# Patient Record
Sex: FEMALE | Race: WHITE | Employment: FULL TIME | URBAN - METROPOLITAN AREA
[De-identification: names, ages, dates, MRNs, and addresses within clinical notes are randomized per-mention and may not be internally consistent; named-entity substitution may affect disease eponyms.]

---

## 2017-04-26 ENCOUNTER — OFFICE VISIT (OUTPATIENT)
Dept: FAMILY MEDICINE CLINIC | Facility: CLINIC | Age: 37
End: 2017-04-26

## 2017-04-26 VITALS
WEIGHT: 110 LBS | HEART RATE: 85 BPM | HEIGHT: 62 IN | SYSTOLIC BLOOD PRESSURE: 110 MMHG | RESPIRATION RATE: 16 BRPM | DIASTOLIC BLOOD PRESSURE: 70 MMHG | OXYGEN SATURATION: 98 % | BODY MASS INDEX: 20.24 KG/M2 | TEMPERATURE: 98 F

## 2017-04-26 DIAGNOSIS — Z20.818 EXPOSURE TO STREP THROAT: ICD-10-CM

## 2017-04-26 DIAGNOSIS — J02.9 SORE THROAT: Primary | ICD-10-CM

## 2017-04-26 PROCEDURE — 87081 CULTURE SCREEN ONLY: CPT | Performed by: NURSE PRACTITIONER

## 2017-04-26 PROCEDURE — 99213 OFFICE O/P EST LOW 20 MIN: CPT | Performed by: NURSE PRACTITIONER

## 2017-04-26 PROCEDURE — 87880 STREP A ASSAY W/OPTIC: CPT | Performed by: NURSE PRACTITIONER

## 2017-04-26 NOTE — PROGRESS NOTES
CHIEF COMPLAINT:   Patient presents with:  Pharyngitis: son with strep        HPI:   Jose Craig is a 39year old female presents to clinic with complaint of sore throat. Patient has had for 1 days. Symptoms have been consistent since onset.   Patient r LUNGS: clear to auscultation bilaterally, no wheezes or rhonchi. Breathing is non labored.   CARDIO: RRR without murmur  GI: good BS's,no masses, hepatosplenomegaly, or tenderness on direct palpation  EXTREMITIES: no cyanosis, clubbing or edema  LYMPH: + an · Suck on throat lozenges, cough drops, hard candy, ice chips, or frozen fruit-juice bars. Use the sugar-free versions if your diet or medical condition requires them. Gargle to ease irritation  Gargling every hour or 2 can ease irritation.  Try gargling w The patient/parent indicates understanding of these issues and agrees to the plan. The patient is asked to follow up with their PCP as needed.

## 2018-04-09 ENCOUNTER — HOSPITAL ENCOUNTER (OUTPATIENT)
Age: 38
Discharge: HOME | End: 2018-04-09
Attending: PEDIATRICS
Payer: COMMERCIAL

## 2018-04-09 VITALS
OXYGEN SATURATION: 98 % | TEMPERATURE: 98 F | HEART RATE: 91 BPM | RESPIRATION RATE: 17 BRPM | SYSTOLIC BLOOD PRESSURE: 123 MMHG | DIASTOLIC BLOOD PRESSURE: 62 MMHG | HEIGHT: 62 IN | WEIGHT: 112 LBS | BODY MASS INDEX: 20.61 KG/M2

## 2018-04-09 DIAGNOSIS — J01.90 ACUTE SINUSITIS, RECURRENCE NOT SPECIFIED, UNSPECIFIED LOCATION: Primary | ICD-10-CM

## 2018-04-09 DIAGNOSIS — J45.21 MILD INTERMITTENT ASTHMA WITH EXACERBATION: ICD-10-CM

## 2018-04-09 PROCEDURE — 99203 OFFICE O/P NEW LOW 30 MIN: CPT

## 2018-04-09 PROCEDURE — 99204 OFFICE O/P NEW MOD 45 MIN: CPT

## 2018-04-09 RX ORDER — AMOXICILLIN 500 MG/1
CAPSULE ORAL
Refills: 0 | COMMUNITY
Start: 2018-04-04

## 2018-04-09 RX ORDER — BUPROPION HYDROCHLORIDE 150 MG/1
TABLET ORAL
Refills: 0 | COMMUNITY
Start: 2018-03-29

## 2018-04-09 RX ORDER — ALBUTEROL SULFATE 90 UG/1
2 AEROSOL, METERED RESPIRATORY (INHALATION) EVERY 4 HOURS PRN
Qty: 1 INHALER | Refills: 0 | Status: SHIPPED | OUTPATIENT
Start: 2018-04-09 | End: 2018-05-09

## 2018-04-09 RX ORDER — AMOXICILLIN AND CLAVULANATE POTASSIUM 875; 125 MG/1; MG/1
1 TABLET, FILM COATED ORAL 2 TIMES DAILY
Qty: 20 TABLET | Refills: 0 | Status: SHIPPED | OUTPATIENT
Start: 2018-04-09 | End: 2018-04-19

## 2018-04-09 NOTE — ED PROVIDER NOTES
Patient presents with:  Weakness      HPI:     Patricia Machado is a 40year old female who presents for evaluation of a chief complaint of respiratory symptoms for about a week and a half. [facility].   She test encounter:      Orders Placed This Encounter      Albuterol Sulfate  (90 Base) MCG/ACT Inhalation Aero Soln          Sig: Inhale 2 puffs into the lungs every 4 (four) hours as needed.           Dispense:  1 Inhaler          Refill:  0      Spacer/Aer

## 2018-04-09 NOTE — ED INITIAL ASSESSMENT (HPI)
Sick since last Monday night. [facility] with bilateral ear infection, and viral pharyngitis. Patient was given amoxicillin, taken as directed since Wednesday night.  Patient was instructed to come to immediate care if sh

## 2023-07-31 ENCOUNTER — APPOINTMENT (OUTPATIENT)
Dept: CT IMAGING | Age: 43
End: 2023-07-31
Attending: EMERGENCY MEDICINE
Payer: COMMERCIAL

## 2023-07-31 ENCOUNTER — HOSPITAL ENCOUNTER (OUTPATIENT)
Age: 43
Discharge: HOME | End: 2023-07-31
Attending: EMERGENCY MEDICINE
Payer: COMMERCIAL

## 2023-07-31 VITALS
DIASTOLIC BLOOD PRESSURE: 68 MMHG | BODY MASS INDEX: 23 KG/M2 | RESPIRATION RATE: 16 BRPM | WEIGHT: 125 LBS | HEART RATE: 90 BPM | HEIGHT: 62 IN | TEMPERATURE: 98 F | OXYGEN SATURATION: 94 % | SYSTOLIC BLOOD PRESSURE: 115 MMHG

## 2023-07-31 DIAGNOSIS — R10.9 ABDOMINAL PAIN OF UNKNOWN ETIOLOGY: Primary | ICD-10-CM

## 2023-07-31 LAB
#MXD IC: 0.6 X10ˆ3/UL (ref 0.1–1)
B-HCG UR QL: NEGATIVE
BUN BLD-MCNC: 17 MG/DL (ref 7–18)
CHLORIDE BLD-SCNC: 103 MMOL/L (ref 98–112)
CO2 BLD-SCNC: 27 MMOL/L (ref 21–32)
CREAT BLD-MCNC: 0.8 MG/DL
EGFRCR SERPLBLD CKD-EPI 2021: 94 ML/MIN/1.73M2 (ref 60–?)
GLUCOSE BLD-MCNC: 132 MG/DL (ref 70–99)
HCT VFR BLD AUTO: 42.6 %
HCT VFR BLD CALC: 41 %
HGB BLD-MCNC: 13.3 G/DL
ISTAT IONIZED CALCIUM FOR CHEM 8: 0.83 MMOL/L (ref 1.12–1.32)
LYMPHOCYTES # BLD AUTO: 0.5 X10ˆ3/UL (ref 1–4)
LYMPHOCYTES NFR BLD AUTO: 5.9 %
MCH RBC QN AUTO: 27.1 PG (ref 26–34)
MCHC RBC AUTO-ENTMCNC: 31.2 G/DL (ref 31–37)
MCV RBC AUTO: 86.9 FL (ref 80–100)
MIXED CELL %: 7 %
NEUTROPHILS # BLD AUTO: 8.1 X10ˆ3/UL (ref 1.5–7.7)
NEUTROPHILS NFR BLD AUTO: 87.1 %
PLATELET # BLD AUTO: 314 X10ˆ3/UL (ref 150–450)
POCT BILIRUBIN URINE: NEGATIVE
POCT GLUCOSE URINE: NEGATIVE MG/DL
POCT KETONE URINE: NEGATIVE MG/DL
POCT LEUKOCYTE ESTERASE URINE: NEGATIVE
POCT NITRITE URINE: NEGATIVE
POCT PH URINE: 6 (ref 5–8)
POCT PROTEIN URINE: NEGATIVE MG/DL
POCT SPECIFIC GRAVITY URINE: 1.02
POCT URINE CLARITY: CLEAR
POCT URINE COLOR: YELLOW
POCT UROBILINOGEN URINE: 0.2 MG/DL
POTASSIUM BLD-SCNC: 3.8 MMOL/L (ref 3.6–5.1)
RBC # BLD AUTO: 4.9 X10ˆ6/UL
SODIUM BLD-SCNC: 140 MMOL/L (ref 136–145)
WBC # BLD AUTO: 9.2 X10ˆ3/UL (ref 4–11)

## 2023-07-31 PROCEDURE — 80047 BASIC METABLC PNL IONIZED CA: CPT

## 2023-07-31 PROCEDURE — 99205 OFFICE O/P NEW HI 60 MIN: CPT

## 2023-07-31 PROCEDURE — 74177 CT ABD & PELVIS W/CONTRAST: CPT | Performed by: EMERGENCY MEDICINE

## 2023-07-31 PROCEDURE — 81025 URINE PREGNANCY TEST: CPT

## 2023-07-31 PROCEDURE — 96360 HYDRATION IV INFUSION INIT: CPT

## 2023-07-31 PROCEDURE — 99204 OFFICE O/P NEW MOD 45 MIN: CPT

## 2023-07-31 PROCEDURE — 85025 COMPLETE CBC W/AUTO DIFF WBC: CPT | Performed by: EMERGENCY MEDICINE

## 2023-07-31 PROCEDURE — 81002 URINALYSIS NONAUTO W/O SCOPE: CPT | Performed by: EMERGENCY MEDICINE

## 2023-07-31 RX ORDER — SODIUM CHLORIDE 9 MG/ML
1000 INJECTION, SOLUTION INTRAVENOUS ONCE
Status: COMPLETED | OUTPATIENT
Start: 2023-07-31 | End: 2023-07-31

## 2023-07-31 RX ORDER — LEFLUNOMIDE 20 MG/1
20 TABLET ORAL DAILY
COMMUNITY

## 2023-07-31 RX ORDER — PREDNISONE 10 MG/1
10 TABLET ORAL DAILY
COMMUNITY

## 2023-07-31 NOTE — ED INITIAL ASSESSMENT (HPI)
Pt c/o lower abdominal cramping wrapping around lower back and into bilat legs starting Thursday, diarrhea on Friday and nausea Saturday, denies vomiting or urinary s/s

## 2024-02-21 ENCOUNTER — LAB ENCOUNTER (OUTPATIENT)
Dept: LAB | Age: 44
End: 2024-02-21
Attending: STUDENT IN AN ORGANIZED HEALTH CARE EDUCATION/TRAINING PROGRAM
Payer: COMMERCIAL

## 2024-02-21 ENCOUNTER — TELEPHONE (OUTPATIENT)
Dept: NEUROLOGY | Facility: CLINIC | Age: 44
End: 2024-02-21

## 2024-02-21 ENCOUNTER — OFFICE VISIT (OUTPATIENT)
Dept: NEUROLOGY | Facility: CLINIC | Age: 44
End: 2024-02-21
Payer: COMMERCIAL

## 2024-02-21 VITALS
OXYGEN SATURATION: 94 % | HEART RATE: 100 BPM | BODY MASS INDEX: 25 KG/M2 | DIASTOLIC BLOOD PRESSURE: 74 MMHG | RESPIRATION RATE: 16 BRPM | WEIGHT: 135.19 LBS | SYSTOLIC BLOOD PRESSURE: 124 MMHG

## 2024-02-21 DIAGNOSIS — R29.898 RIGHT ARM WEAKNESS: ICD-10-CM

## 2024-02-21 DIAGNOSIS — R20.2 PARESTHESIAS: ICD-10-CM

## 2024-02-21 DIAGNOSIS — G62.9 SMALL FIBER NEUROPATHY: ICD-10-CM

## 2024-02-21 DIAGNOSIS — G50.0 RIGHT TRIGEMINAL NEURALGIA: ICD-10-CM

## 2024-02-21 DIAGNOSIS — R29.2 HYPERREFLEXIA: ICD-10-CM

## 2024-02-21 DIAGNOSIS — R29.898 RIGHT ARM WEAKNESS: Primary | ICD-10-CM

## 2024-02-21 LAB
ANION GAP SERPL CALC-SCNC: 6 MMOL/L (ref 0–18)
BUN BLD-MCNC: 13 MG/DL (ref 9–23)
CALCIUM BLD-MCNC: 10.2 MG/DL (ref 8.5–10.1)
CHLORIDE SERPL-SCNC: 103 MMOL/L (ref 98–112)
CO2 SERPL-SCNC: 30 MMOL/L (ref 21–32)
CREAT BLD-MCNC: 0.83 MG/DL
EGFRCR SERPLBLD CKD-EPI 2021: 90 ML/MIN/1.73M2 (ref 60–?)
FASTING STATUS PATIENT QL REPORTED: NO
GLUCOSE BLD-MCNC: 101 MG/DL (ref 70–99)
HCG UR QL: NEGATIVE
OSMOLALITY SERPL CALC.SUM OF ELEC: 288 MOSM/KG (ref 275–295)
POTASSIUM SERPL-SCNC: 4.2 MMOL/L (ref 3.5–5.1)
SODIUM SERPL-SCNC: 139 MMOL/L (ref 136–145)

## 2024-02-21 PROCEDURE — 99205 OFFICE O/P NEW HI 60 MIN: CPT | Performed by: STUDENT IN AN ORGANIZED HEALTH CARE EDUCATION/TRAINING PROGRAM

## 2024-02-21 PROCEDURE — 81025 URINE PREGNANCY TEST: CPT

## 2024-02-21 PROCEDURE — 3078F DIAST BP <80 MM HG: CPT | Performed by: STUDENT IN AN ORGANIZED HEALTH CARE EDUCATION/TRAINING PROGRAM

## 2024-02-21 PROCEDURE — 80048 BASIC METABOLIC PNL TOTAL CA: CPT

## 2024-02-21 PROCEDURE — 3074F SYST BP LT 130 MM HG: CPT | Performed by: STUDENT IN AN ORGANIZED HEALTH CARE EDUCATION/TRAINING PROGRAM

## 2024-02-21 PROCEDURE — 36415 COLL VENOUS BLD VENIPUNCTURE: CPT

## 2024-02-21 RX ORDER — PREDNISONE 5 MG/1
5 TABLET ORAL DAILY
COMMUNITY
Start: 2023-12-28

## 2024-02-21 RX ORDER — IBUPROFEN 200 MG
800 TABLET ORAL AS NEEDED
COMMUNITY

## 2024-02-21 RX ORDER — PREDNISONE 1 MG/1
4 TABLET ORAL DAILY
COMMUNITY
Start: 2024-02-03

## 2024-02-21 NOTE — TELEPHONE ENCOUNTER
Dr Bruno saw patient today and referred patient to [facility], Neurology for evaluation/ autonomic testing for possible small fiber neuropathy. Advised patient to call her insurance to  if covered.Referral faxed with fax confirmation received.

## 2024-02-21 NOTE — H&P
Neurology  History & Physical      HISTORY OF PRESENT ILLNESS:  Neurology new office visit    Interval history February 21, 2024:   -Sweat test 7/5/2023 suggestive of small fiber neuropathy affecting sudomotor fibers of right upper extremity and right lower extremity   =electromyelogram and nerve conduction study (EMG/NCS) 7/5/2023 right arm and leg unrevealing  -Skin biopsy, do not see report from this.   -Saw Dr. Romano 8/2023 \"Morelia Camarillo is a 43Yrs old female patient who presents with right sided numbness, paresthesia, weakness for 2 years now, associated with joints pain. she had rheumatologic evaluation and workup that have been negative so far. She was started on prednisone and leflunomide that she thinks might somewhat slow progression of her symptoms. She had recent EMG that is reported normal, skin biopsy reported as normal. A sweat test showed decreased sweat on the right side. Per patient, the left side was not tested. She sometimes had paresthesia on both hands and feet.She is pending lip biopsy to rule out sjogren's . And she plans on seeing an autonomic specialist. We will wait to see the autonomic workup before deciding on future direction, workup and treatment. Her symptoms and sweat test suggest some dysautonomia. We cannot yet answer what seems to be the cause. We agrees with Sjogren's workup as well as seeing the autonomic neurologist for evaluation and workup.\"    Patient presents to establish care. I already know her from my time in fellowship at the [facility] with Dr. Romano. She reports seeing Dr. Romano in August, he said at early stage of something. At the time didn't see enough clinical change to order MRIs again. Advised following up in a year or sooner if needed. He is on board. She reports symptoms continue to progress. Leflunomide has helped with working around and functioning. Pain and neuropathy on right side very difficult. Two weeks ago and two days ago mobility  impairment in right arm. Normally mobility impairment in legs, sits down and works. Couldn't work at computer. Also having floaters in eye sight. Has had some acid reflux that was not happening and is popping back up. Word retrieval and memory becoming a bigger issue. Worsened over the past few months insidiously, has noticed more difference with right arm in past few weeks (seems triggered by activity). Dr. Gipson- sheldon arvizu, going to [facility], last visit 12/2023. Did not have good experience with ware    Neur review of systems:  -Cognition: more word finding troubles over months, has had a couple instances where could not bring words (could not find words, words on tip of tongue), has had a couple times where really got stuck lasting a couple minutes, no other associated symptoms that were more pronounced  -Fatigue: yes  -Pain: sharp pains right side of face, thought it was teeth (dental workup unrevealing), pain consistent with some zapping, could trigger with palpation to certain area, lasted for a couple weeks 11/2023, self resolved  -Depression/anxiety: no big changes, some anxiety in setting of unknown  -Vision: she reports floaters periodically when in shower confident in both eyes, getting out of shower. Floaters improved with getting out of shower and cooling off (some associated lightheadedness). Intermittently will see something in peripheral vision not moving (no confusion, bowel/bladder loss of control). Usually happens when standing. Eye doctor doesn't see any proteins floating around per patient.   -Dysphagia/dysarthria: intermittent trouble with solids/liquids similar (better on lefluonomide 20 mg, no major aspiration issues since 1/2023), couple episodes of reflux in last month  -GI: early satiety, acid reflux without clear provocation  -Motor: right arm/face/leg weakness, feels getting steadily worse, mostly able to maintain functioning. Was better on leflunomide. When she has bad days  it lasts for day, usually some improvement when wakes in AM and has rested, sometimes goes on for a few days before resolves. Right arm got really weak and very painful, more she tried to use it the weaker it was getting, rested it, lasted for better part of day, had to stop due to arm not working.   -Sensory: right arm/face/leg numbness and tingling getting worse overall, rare tingling on left side of foot but not as pronounced. Right arm tremor improved on leflunomide but still slightly evident with postural hold   -Ambulation: sometimes feels like needs a cane, some close calls with walking. Last January 2023 fell in January- legs gave out (getting out of car, no premonitory signs, no passing out, gets right back up and back to normal immediately, two similar episodes in the fall), when feeling weak feels a little wobbly   -Bowel: no bleeding in awhile, feels body has adjusted to leflunomide (previously some loose stools in AM)  -Bladder: thinks she feels fully empties bladder, no recent infections, no urinary frequency  -Lhermittes: some localized shoulder pito  -Tobacco: no  -Exercise: stretches almost daily, hasn't done a whole lot more than that as seems to aggravate symptoms    Leflunomide last incresaed 1/2023. Started 10/2022 didn't work iniitally.     Interval history 05/16/23:   -Per jerald 5/2023 \"Helpful for you to know: started Leflunomide in October and while it hasn't helped the rheum symptoms too much it has helped the neuro symptoms significantly. It isn't perfect but minus the fall in January I haven't fallen or aspirated since being on it. However, began new job in March which is a really positive thing. I am more physically active and that is escalating symptoms again. Headaches, pain in face, choking on saliva/water, nerve pain in extremities, weakness on the right side, and fatigue have all increased again. I have also had an increase in GI symptoms which may be medication related. I have an  upcoming GI consult this Friday and am looking into a separate rheum consult. I see my current rheum for follow up on Thursday.\"  -Saw rheum 2/2023 at [facility] for joint pain \"Joint pains Life-long history of diffuse body pain, which has been attributed to fibromyalgia, and most recent development of diffuse joint pains and stiffness since October 2022, which may have been mildly responsive corticosteroids; unresponsive (maybe a little worse with leflunomide). There are no features of inflammatory arthritis on physical examination. Immunoserologic evaluation for systemic rheumatic diseases associated with inflammatory arthritis was unrevealing. Otherwise, patient has experienced paresthesias and mild weakness affecting the entire right-side of her body, with negative CNS imaging. At the moment, I question her presentation is consistent with inflamamtory arthritis and recommend additional investigation. Normal hand MRI was supportive of our suspicion that her pains are not inflammatory in nature and I have not been able to identify any objective evidence of an underlying systemic autoimmune disease.\" Advised discontinuing leflunomide.   -Seen in hospital 12/2022 by neurology consult team \"41 yo F with PMHx MCTD, reported vasculitis, fibromyalgia, IBS who follows with Dr. Romano for multiple neurologic symptoms (right hemibody loss of sensation, increased LE DTR). Today examination is significant for right hemibody subjective decreased sensation, increase LE DTRs and +BRIAN maneuver suggesting hip pathology rather than lower back pathology. No need for further neurological imaging; recommend rheumatology f/u and OP f/u with Dr. Romano.  -Per rheum 1/2023 [facility] \"this is a 42 y.o. female with possible UCTD based on symptoms (though negative blood work). She has had some response to leflunomide 10 mg, and since this medication can work for neurologic indications, and increasing its dose to 20 mg daily. She will be seeing  rheumatology [facility] next month for another opinion. Continue with prednisone 10 mg daily for now. Bactrim 3 times weekly.\"    Per discussion with patient, leflunomide seems to be tempering neurologic symptoms. Has noticed since going back to work and being more active, some symptoms a bit more exacerbated (headaches, choking, weakness), but overall improved. Started leflunomide 10/2022, then increased dose 2023 which seemed to help. Reports one new symptoms with rectal bleeding (arranging appointment with GI)- has regular loose stools with leflunomide, every time she used bathroom, quite a bit of blood like menstrual cycle (blood separate from bowel movement)- bright red. Stopped spontaneously after about 24 hours. Hasn't had swallow evaluation yet, waiting to see GI. Hasn't seen GI for years.     PAST MEDICAL HISTORY:  Past Medical History:   Diagnosis Date    Anxiety     Connective tissue disease (HCC)     Fibromyalgia     IBS (irritable bowel syndrome)        PAST SURGICAL HISTORY:  Past Surgical History:   Procedure Laterality Date             FAMILY HISTORY:  family history includes Diabetes in her paternal grandmother; Heart Disorder in her maternal grandfather.  FMHx: Paternal first cousin struggling for 15 years with similar course, diagnosed with psoriatic arthritis, also with paternal uncle with some sort of autoimmune dysfunction. Paternal uncle had stroke in 50s in setting of psoriatic arthritis. Another paternal cousin with psoriasis    SOCIAL HISTORY:   reports that she has never smoked. She has never used smokeless tobacco. She reports that she does not currently use alcohol. She reports that she does not use drugs. Works in social work.     ALLERGIES:  No Known Allergies    MEDICATIONS:  Prior to Admission Medications   Medication Sig    predniSONE 5 MG Oral Tab Take 1 tablet (5 mg total) by mouth daily.    predniSONE 1 MG Oral Tab Take 4 tablets (4 mg total) by mouth daily.     ibuprofen (ADVIL) 200 MG Oral Tab Take 4 tablets (800 mg total) by mouth as needed for Pain.    leflunomide 20 MG Oral Tab Take 1 tablet (20 mg total) by mouth daily.    BuPROPion HCl ER, XL, 150 MG Oral Tablet 24 Hr 1 tablet (150 mg total)  in the morning and 1 tablet (150 mg total) before bedtime.    alprazolam 0.25 MG Oral Tab Take 1 tablet (0.25 mg total) by mouth nightly as needed for Sleep.     No current facility-administered medications for this visit.       REVIEW OF SYSTEMS:  A 10-point system was reviewed.  Pertinent positives and negatives are noted in HPI.      PHYSICAL EXAMINATION:  VITAL SIGNS: /74   Pulse 100   Resp 16   Wt 135 lb 3.2 oz (61.3 kg)   LMP 07/06/2023   SpO2 94%   BMI 24.73 kg/m²   General: She is not in acute distress.  Appearance: She is not ill-appearing.   HENT:   Head: Normocephic  Effort: Pulmonary effort is normal.   Musculoskeletal:   General: No swelling. Normal range of motion.   Cervical back: Normal range of motion and neck supple.   Skin:  General: Skin is warm and dry.     Neurologic Exam   Mental status & higher functions: Awake & alert, oriented x3, normal attention, language and speech. Judgement appropriate    Cranial nerves: II: Preserved visual fields. Optic disc margins sharp  III-IV-VI: 4 mm Pupils equal round and reactive to light. Gaze conjugate, vertical and horizontal eye movements intact. No nystagmus.   V1-2-3: Facial sensation intact but more sensitive on right. VII: symmetric, intact strength VIII: Hearing preserved.   IX-X: symmetric palate elevation. XII: tongue protrudes midline w/o fasciculations.   XI: turns head bilaterally, shrugs shoulders symmetrically.  MOTOR   Bulk: Preserved throughout   Tone: Normal in BUE and BLE   Pronator drift: Present on right (trace)  Trace tremor in right hand postural (appears physiologic)   Effort: Adequate   STRENGTH Left Right root nerve   Deltoid 5 5 C5, C6 axillary   Biceps 5 5 C5, C6 musculocutan    Triceps 5 5 C7 radial   Wrist extension 5 5 C6, C7 radial   Wrist flexion 5 5 C6, C7, C8, T1 median-ulnar   Finger flex (DIP) 5 5 C7, C8, T1 median-ulnar   Finger extension 5 5 C7, C8 post. interosseus   Finger abduction 5 5 C8, T1 ulnar   Thumb abd (ABP) 5 5 C8, T1 median   Hand grasp 5 5 multi multi         Hip flexion 5 4.8, some giveway /L2, L3, L4 femoral   Knee extension 5 5 L2, L3, L4 femoral   Knee flexion 5 5 L5, S1,S2 sciatic   Ankle Dorsiflexion 5 5 L4, L5 deep peroneal   Ankle Plantarflexion 5 5 S1, S2 tibial       No negative asterixis with hands outstretched.     REFLEXES Left Right root   BR 2+ 2+ C5-6   Biceps 2+ 2+ C5-6   Triceps 2+ 2+ C7   Hanson’s neg neg   Patella 2 2 L3-L4   Hamstring neg neg L5   Achilles 2 2 S1   Plantar clonus neg neg   Toes/Babinski down down   Sensory: pin prick decreased right leg/face/arm, vib decresed right great toe 12 s  Coordination: FTN, HTS reveal no ataxia or dysmetria. No dysdiadochokinesia.  Station & gait: Romberg negative, normal posture, stability/balance, stride and arm swing. Able to tandem.      DIAGNOSTIC DATA:      IMAGING:  MRI brain wo, MRI lumbar wo (3/29/2022):  Impression: Normal MRI of brain with contrast.     Impression:     1.  Mild disc desiccation without disc herniation, central stenosis, or foraminal narrowing throughout lumbar spine.  See above discussion.     2.  Normal conus medullaris.  No focal collections are noted in lumbar spinal canal.     3.  Normal alignment.  Vertebral body height and marrow signal appear normal.     CTA head/neck 7/3/22  Impression:   1. Patency of the intracranial arterial circulation   2. Patency of the arterial vasculature of the neck.   3. No acute intracranial abnormality.   See above details/description of other findings. Please clinically correlate.     MR brain 2/2022:   FINDINGS:   No restricted diffusion to suggest acute intracranial process.     No abnormal parenchymal or leptomeningeal enhancement.      There is no acute intracranial hemorrhage, extracerebral fluid collection, or significant midline shift.     Ventricles and sulci are normal in size and configuration for the patient's age. No focal white matter lesion     The visualized paranasal sinuses and mastoid air cells are clear.     Partially visualized cervical spondylosis in the mid cervical spine with associated mild spinal canal stenosis     IMPRESSION:     1.  No acute intracranial abnormality.     2.  No white matter lesion or abnormal enhancement     - OSH (1/25/2022): neg (JUNE, RF, CCP), normal CRP and ESR  MRI wwo R hand (2/22/23): wnl, no signs of synovitis    Per 2022 note \" Neurology [facility] who ordered MRI cervical which per patient was normal maybe slight difference is disc. 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 She reports symptoms continue to progress insidiously with right face/arm/leg paresthesias, with waxing and waning right arm/face/leg mobility impairment. Reports intermittent \"floaters\" in eyes with showering, and some word retrieval troubles, early satiety. Symptoms worse with activity. She recently realized there is a family history with similar constellation of symptoms. Thinks leflunomide may have slowed sx progression. Exam with reduced pinprick right face/arm/leg, trace right hip flexion weakness, normal reflexes. Recent sweat test with reduced sweat on right side suggestive of small fiber neuropathy affecting sudomotor fibers. Recent skin biopsy reportedly normal (per Dr. Romano note). Lip biopsy result unclear. Clinical picture  concerning for small fiber neuropathy of unclear etiology. Given reports of intermittent motor symptoms and normal reflexes, will pursue neuro-axis imaging again. Also will order some blood work; she follows closely with rheumatology, but I do not currently have access to those records, ask that she provide them to us when able. Her rheumatologist advise she seek another opinion at [facility], which we support. Discussed a gastric emptying study and swallow eval; she will hold off for now, as may get done at [facility]. She is to let us know if she wants to pursue. Will also refer to my colleagues at the [facility] to look into possible autonomic testing and help in quest for etiology (Dr. Patterson or Cade). Pending blood-work, may consider nerve biopsy or further vascular imaging. She is to let us know if symptoms evolve; ED for new neuro symptoms/sudden onset.       Plan:  -MRI brain/cervical spine/thoracic spine with/without contrast   -Go to lab prior for pregnancy and kidney function test  -Support seeing [facility] for more in depth rheumatologic work-up   -May consider gastric emptying study, autonomic testing  -Referral to the [facility] autonomics specialists, has appt with DR Justin next year  -Go to the lab when able    Total time 64 minutes including chart review, eliciting history, physical exam, and counseling.    Jeannette Bruno, DO

## 2024-02-21 NOTE — PROGRESS NOTES
Patient states right sided weakness, which started about 2 years ago. Decrease in gait, denies recent falls. Patient states numbness, tinging and pain on the right side.

## 2024-02-21 NOTE — TELEPHONE ENCOUNTER
Jeannette Bruno DO [facility] [place] [facility] Hello,   Can someone please fax the MRI referrals (brain/cervical/thoracic) to [facility]?  And please send patient update when faxed.    Faxed to [facility] with confirmation received.     Patient advised.

## 2024-02-21 NOTE — PATIENT INSTRUCTIONS
Plan:  -MRI Brain/cervical/thoracic spine   -Bring CD with all images  -Referral for autonomics specialist  -Please ask rheumatologist to fax the notes  Refill policies:    Allow 2-3 business days for refills; controlled substances may take longer.  Contact your pharmacy at least 5 days prior to running out of medication and have them send an electronic request or submit request through the “request refill” option in your [employer] account.  Refills are not addressed on weekends; covering physicians do not authorize routine medications on weekends.  No narcotics or controlled substances are refilled after noon on Fridays or by on call physicians.  By law, narcotics must be electronically prescribed.  A 30 day supply with no refills is the maximum allowed.  If your prescription is due for a refill, you may be due for a follow up appointment.  To best provide you care, patients receiving routine medications need to be seen at least once a year.  Patients receiving narcotic/controlled substance medications need to be seen at least once every 3 months.  In the event that your preferred pharmacy does not have the requested medication in stock (e.g. Backordered), it is your responsibility to find another pharmacy that has the requested medication available.  We will gladly send a new prescription to that pharmacy at your request.    Scheduling Tests:    If your physician has ordered radiology tests such as MRI or CT scans, please contact Central Scheduling at [phone] right away to schedule the test.  Once scheduled, the [facility] Centralized Referral Team will work with your insurance carrier to obtain pre-certification or prior authorization.  Depending on your insurance carrier, approval may take 3-10 days.  It is highly recommended patients assure they have received an authorization before having a test performed.  If test is done without insurance authorization, patient may be responsible for the entire amount billed.       Precertification and Prior Authorizations:  If your physician has recommended that you have a procedure or additional testing performed the [facility] Centralized Referral Team will contact your insurance carrier to obtain pre-certification or prior authorization.    You are strongly encouraged to contact your insurance carrier to verify that your procedure/test has been approved and is a COVERED benefit.  Although the [facility] Centralized Referral Team does its due diligence, the insurance carrier gives the disclaimer that \"Although the procedure is authorized, this does not guarantee payment.\"    Ultimately the patient is responsible for payment.   Thank you for your understanding in this matter.  Paperwork Completion:  If you require FMLA or disability paperwork for your recovery, please make sure to either drop it off or have it faxed to our office at [phone]. Be sure the form has your name and date of birth on it.  The form will be faxed to our Forms Department and they will complete it for you.  There is a 25$ fee for all forms that need to be filled out.  Please be aware there is a 10-14 day turnaround time.  You will need to sign a release of information (GOMEZ) form if your paperwork does not come with one.  You may call the Forms Department with any questions at [phone].  Their fax number is [phone].

## 2024-02-26 ENCOUNTER — APPOINTMENT (OUTPATIENT)
Dept: CT IMAGING | Facility: HOSPITAL | Age: 44
End: 2024-02-26
Attending: EMERGENCY MEDICINE
Payer: COMMERCIAL

## 2024-02-26 ENCOUNTER — TELEPHONE (OUTPATIENT)
Dept: NEUROLOGY | Facility: CLINIC | Age: 44
End: 2024-02-26

## 2024-02-26 ENCOUNTER — HOSPITAL ENCOUNTER (EMERGENCY)
Facility: HOSPITAL | Age: 44
Discharge: HOME | End: 2024-02-26
Attending: EMERGENCY MEDICINE
Payer: COMMERCIAL

## 2024-02-26 VITALS
HEART RATE: 84 BPM | TEMPERATURE: 97 F | RESPIRATION RATE: 19 BRPM | WEIGHT: 125 LBS | OXYGEN SATURATION: 97 % | BODY MASS INDEX: 23 KG/M2 | SYSTOLIC BLOOD PRESSURE: 120 MMHG | DIASTOLIC BLOOD PRESSURE: 79 MMHG

## 2024-02-26 DIAGNOSIS — R53.1 RIGHT SIDED WEAKNESS: Primary | ICD-10-CM

## 2024-02-26 PROBLEM — G62.9 SMALL FIBER NEUROPATHY: Status: ACTIVE | Noted: 2024-02-26

## 2024-02-26 LAB
ALBUMIN SERPL-MCNC: 4.1 G/DL (ref 3.4–5)
ALBUMIN/GLOB SERPL: 1.2 {RATIO} (ref 1–2)
ALP LIVER SERPL-CCNC: 71 U/L
ALT SERPL-CCNC: 13 U/L
ANION GAP SERPL CALC-SCNC: 7 MMOL/L (ref 0–18)
AST SERPL-CCNC: 14 U/L (ref 15–37)
ATRIAL RATE: 87 BPM
BASOPHILS # BLD AUTO: 0.05 X10(3) UL (ref 0–0.2)
BASOPHILS NFR BLD AUTO: 0.6 %
BILIRUB SERPL-MCNC: 0.4 MG/DL (ref 0.1–2)
BUN BLD-MCNC: 12 MG/DL (ref 9–23)
CALCIUM BLD-MCNC: 9.7 MG/DL (ref 8.5–10.1)
CHLORIDE SERPL-SCNC: 107 MMOL/L (ref 98–112)
CO2 SERPL-SCNC: 26 MMOL/L (ref 21–32)
CREAT BLD-MCNC: 0.73 MG/DL
EGFRCR SERPLBLD CKD-EPI 2021: 105 ML/MIN/1.73M2 (ref 60–?)
EOSINOPHIL # BLD AUTO: 0.01 X10(3) UL (ref 0–0.7)
EOSINOPHIL NFR BLD AUTO: 0.1 %
ERYTHROCYTE [DISTWIDTH] IN BLOOD BY AUTOMATED COUNT: 13.1 %
GLOBULIN PLAS-MCNC: 3.5 G/DL (ref 2.8–4.4)
GLUCOSE BLD-MCNC: 94 MG/DL (ref 70–99)
HCG SERPL QL: NEGATIVE
HCT VFR BLD AUTO: 40.6 %
HGB BLD-MCNC: 13.3 G/DL
IMM GRANULOCYTES # BLD AUTO: 0.04 X10(3) UL (ref 0–1)
IMM GRANULOCYTES NFR BLD: 0.5 %
LYMPHOCYTES # BLD AUTO: 0.58 X10(3) UL (ref 1–4)
LYMPHOCYTES NFR BLD AUTO: 7.3 %
MCH RBC QN AUTO: 28.2 PG (ref 26–34)
MCHC RBC AUTO-ENTMCNC: 32.8 G/DL (ref 31–37)
MCV RBC AUTO: 86 FL
MONOCYTES # BLD AUTO: 0.45 X10(3) UL (ref 0.1–1)
MONOCYTES NFR BLD AUTO: 5.7 %
NEUTROPHILS # BLD AUTO: 6.77 X10 (3) UL (ref 1.5–7.7)
NEUTROPHILS # BLD AUTO: 6.77 X10(3) UL (ref 1.5–7.7)
NEUTROPHILS NFR BLD AUTO: 85.8 %
OSMOLALITY SERPL CALC.SUM OF ELEC: 290 MOSM/KG (ref 275–295)
P AXIS: 73 DEGREES
P-R INTERVAL: 136 MS
PLATELET # BLD AUTO: 311 10(3)UL (ref 150–450)
POTASSIUM SERPL-SCNC: 4.2 MMOL/L (ref 3.5–5.1)
PROT SERPL-MCNC: 7.6 G/DL (ref 6.4–8.2)
Q-T INTERVAL: 330 MS
QRS DURATION: 72 MS
QTC CALCULATION (BEZET): 397 MS
R AXIS: -15 DEGREES
RBC # BLD AUTO: 4.72 X10(6)UL
SODIUM SERPL-SCNC: 140 MMOL/L (ref 136–145)
T AXIS: 68 DEGREES
TROPONIN I SERPL HS-MCNC: 4 NG/L
VENTRICULAR RATE: 87 BPM
WBC # BLD AUTO: 7.9 X10(3) UL (ref 4–11)

## 2024-02-26 PROCEDURE — 84484 ASSAY OF TROPONIN QUANT: CPT | Performed by: EMERGENCY MEDICINE

## 2024-02-26 PROCEDURE — 85025 COMPLETE CBC W/AUTO DIFF WBC: CPT | Performed by: EMERGENCY MEDICINE

## 2024-02-26 PROCEDURE — 80053 COMPREHEN METABOLIC PANEL: CPT | Performed by: EMERGENCY MEDICINE

## 2024-02-26 PROCEDURE — 84484 ASSAY OF TROPONIN QUANT: CPT

## 2024-02-26 PROCEDURE — 70450 CT HEAD/BRAIN W/O DYE: CPT | Performed by: EMERGENCY MEDICINE

## 2024-02-26 PROCEDURE — 93010 ELECTROCARDIOGRAM REPORT: CPT

## 2024-02-26 PROCEDURE — 85025 COMPLETE CBC W/AUTO DIFF WBC: CPT

## 2024-02-26 PROCEDURE — 80053 COMPREHEN METABOLIC PANEL: CPT

## 2024-02-26 PROCEDURE — 99284 EMERGENCY DEPT VISIT MOD MDM: CPT

## 2024-02-26 PROCEDURE — 93005 ELECTROCARDIOGRAM TRACING: CPT

## 2024-02-26 PROCEDURE — 84703 CHORIONIC GONADOTROPIN ASSAY: CPT | Performed by: EMERGENCY MEDICINE

## 2024-02-26 PROCEDURE — 99285 EMERGENCY DEPT VISIT HI MDM: CPT

## 2024-02-26 PROCEDURE — 36415 COLL VENOUS BLD VENIPUNCTURE: CPT

## 2024-02-26 NOTE — ED PROVIDER NOTES
Patient Seen in: [facility] Emergency Department      History     Chief Complaint   Patient presents with    Numbness Weakness     Stated Complaint: sent by neurology for worsening weakness to right side x 4 days    Subjective:   HPI    This is a 43-year-old female with past medical history per neurology records of unspecified connective tissue disease, small fiber neuropathy, vasculitis, fibromyalgia, IBS presents emergency room for evaluation of intermittent right-sided arm and leg weakness.  Patient reports the symptoms have been present for the last 2 years, she has undergone extensive neurologic exam and is currently under the care of Dr. Bruno from the neurology department.  States that over the weekend she felt slightly increased right-sided weakness, call the clinic and was instructed to go to the ER.  She states that symptoms do feel better today.  She denies any visual changes at difficulty speaking or swallowing.  Denies chest pain or shortness of breath denies abdominal pain.  Patient does have MRIs of the brain, cervical spine, thoracic spine ordered.  Is also been noted that she is to see the [facility] for further evaluation as well as the [facility] autonomic specialist and currently has an appointment with physician at that facility.    Objective:   Past Medical History:   Diagnosis Date    Anxiety     Connective tissue disease (HCC)     Fibromyalgia     IBS (irritable bowel syndrome)               Past Surgical History:   Procedure Laterality Date                      Social History     Socioeconomic History    Marital status:    Tobacco Use    Smoking status: Never    Smokeless tobacco: Never   Vaping Use    Vaping Use: Never used   Substance and Sexual Activity    Alcohol use: Not Currently    Drug use: No   Other Topics Concern    Caffeine Concern Yes     Comment: coffee    Exercise Yes     Comment: stretching              Review of  Systems    Positive for stated complaint: sent by neurology for worsening weakness to right side x 4 days  Other systems are as noted in HPI.  Constitutional and vital signs reviewed.      All other systems reviewed and negative except as noted above.    Physical Exam     ED Triage Vitals [02/26/24 1157]   /89   Pulse 111   Resp 16   Temp 97.4 °F (36.3 °C)   Temp src    SpO2 97 %   O2 Device None (Room air)       Current:/79   Pulse 84   Temp 97.4 °F (36.3 °C)   Resp 19   Wt 56.7 kg   LMP 07/06/2023   SpO2 97%   BMI 22.86 kg/m²         Physical Exam    GENERAL: Patient is awake, alert, well-appearing, in no acute distress.  HEENT: Pupils equal round reactive to light, extraocular muscles are intact, there is no scleral icterus.  Mucous membranes are moist, oropharynx is clear, uvula midline.    Scalp is atraumatic.  NECK: Neck is supple, there is no nuchal rigidity.    HEART: Regular rate and rhythm, no murmurs.  LUNGS: Clear to auscultation bilaterally.  No Rales, no rhonchi, no wheezing, no stridor.  ABDOMEN: Soft, nondistended,non tender  EXTREMITIES: No peripheral edema, no calf tenderness, dorsal pedal pulses present and equal bilaterally.  SKIN: Warm, dry, intact, no rashes.  NEUROLOGIC EXAM: Tongue midline, no facial drooping, no ptosis, muscle strength +5/5 bilateral upper and lower extremities cerebellar finger to nose intact, no pronator drift, sensation intact.        ED Course     Labs Reviewed   COMP METABOLIC PANEL (14) - Abnormal; Notable for the following components:       Result Value    AST 14 (*)     All other components within normal limits   CBC W/ DIFFERENTIAL - Abnormal; Notable for the following components:    Lymphocyte Absolute 0.58 (*)     All other components within normal limits   TROPONIN I HIGH SENSITIVITY - Normal   HCG, BETA SUBUNIT, QUAL - Normal   CBC WITH DIFFERENTIAL WITH PLATELET    Narrative:     The following orders were created for panel order CBC With  Differential With Platelet.  Procedure                               Abnormality         Status                     ---------                               -----------         ------                     CBC W/ DIFFERENTIAL[[id number]]          Abnormal            Final result                 Please view results for these tests on the individual orders.   RAINBOW DRAW LAVENDER   RAINBOW DRAW LIGHT GREEN   RAINBOW DRAW BLUE     EKG    Rate, intervals and axes as noted on EKG Report.  Rate: 87  Rhythm: Sinus Rhythm  Reading: Normal sinus rhythm, no ST elevation.                          MDM        Differential diagnosis before testing includes but not limited to CVA, intracranial mass, MS, electrolyte abnormality, which is a medical condition that poses a threat to life/function    Past Medical History/comorbidities-as noted in HPI      Radiographic images  I personally reviewed the radiographs and my individual interpretation shows CT brain no intracranial hemorrhage  I also reviewed the official reports that showed CT brain no acute abnormality    Discussion of management (consult/physicians, social work, pharmacy,ect) neurology       Course of Events during Emergency Room Visit include upon arrival to emergency room patient was evaluated, discussed with neurologist who recommended plain CT of the brain and if this is unremarkable patient can be discharged.  CT performed without acute findings, CBC and chemistry unremarkable.  Patient currently is moving all 4 extremities without difficulty has equal strength bilaterally.  Patient states she has had intermittent symptoms but currently symptom-free, on review of medical records she has been suffering from the symptoms of the last 2 years has had workup with neurology and is being referred to [facility] and [facility].  Patient does have MRI scheduled and ordered by neurology which I encouraged her to complete.  Continue with her  current treatment as outlined by her neurologist return to ER if any further problems or change or new symptoms.  Patient agrees with plan as well.  Discharge good condition    Shared decision making was utilized           Disposition:      Discharge  I have discussed with the patient the results of test, differential diagnosis, treatment plan, warning signs and symptoms which should prompt immediate return.  They expressed understanding of these instructions and agrees to the following plan provided.  They were given written discharge instructions and agrees to return for any concerns and voiced understanding and all questions were answered.    Note to patient: The 21st Century Cures Act makes medical notes like these available to patients in the interest of transparency. However, this is a medical document intended as peer to peer communication. It is written in medical language and may contain abbreviations or verbiage that are unfamiliar. It may appear blunt or direct. Medical documents are intended to carry relevant information, facts as evident, and the clinical opinion of the practitioner.                                            Medical Decision Making      Disposition and Plan     Clinical Impression:  1. Right sided weakness         Disposition:  Discharge  2/26/2024  3:57 pm    Follow-up:  Jeannette Bruno DO  [address] [address]  [phone]    Follow up in 2 day(s)            Medications Prescribed:  Discharge Medication List as of 2/26/2024  3:59 PM

## 2024-02-26 NOTE — TELEPHONE ENCOUNTER
S: Increasing right sided weakness.    B: LOV 2/1/24       Plan:  -MRI Brain/cervical/thoracic spine                -Bring CD with all images  -Referral for autonomics specialist  -Please ask rheumatologist to fax the notes           A: Patient states can use right  arm a little but then becomes not functional. Also has SOB today which is not usual for her. Woke yesterday and had blurred vision for 30 minutes.   Patient states her weakness on right side is worsening and she is even hesitant to drive or take her daughter outside.     R: Message routed to Dr Castillo as Dr Bruno is not in clinic today.

## 2024-02-26 NOTE — TELEPHONE ENCOUNTER
Pt states her symptoms are increasingly worsening. Worsening weakness Rt side, functional impairment in Rt arm, work up Sunday with blurry vision. She is considering going to the ER today. She is wondering if Provider would offer a different plan. Please advise, Pt's best call back number is [phone]. Endorsed to RN for Provider.

## 2024-02-26 NOTE — TELEPHONE ENCOUNTER
Discussed condition update with Dr Castillo who states if patient's weakness progresses today, she should be evaluated in the ER. Patient denies any signs of infection.   Patient is scheduled to have the MRI's at [facility] on 4/2 but is on wait list. (Having them done at [facility] because previous imaging ws done at [facility].  Requested that patient keep Dr Bruno posted on condition.

## 2024-02-26 NOTE — TELEPHONE ENCOUNTER
Dr Castillo states she would like patient to check her B/P also.   Spoke with Morelia and gave her Dr Castillo's recommendation to check B/P. Patient states her weakness has not changed much since yesterday but has had a marked change in the last 4 days. Patient states she is planning to be seen in ER this morning.

## 2024-02-26 NOTE — ED INITIAL ASSESSMENT (HPI)
Worsening weakness to right side x 4 days, noted first on Thursday   Weakness To R arm and R leg, with headache  Sent in from neuro, hx of same from week prior, has been following neuro for this for the last 2 years.

## 2024-02-27 ENCOUNTER — TELEPHONE (OUTPATIENT)
Dept: NEUROLOGY | Facility: CLINIC | Age: 44
End: 2024-02-27

## 2024-02-27 DIAGNOSIS — E83.52 HYPERCALCEMIA: Primary | ICD-10-CM

## 2024-02-29 ENCOUNTER — TELEPHONE (OUTPATIENT)
Dept: NEUROLOGY | Facility: CLINIC | Age: 44
End: 2024-02-29

## 2024-02-29 ENCOUNTER — PATIENT MESSAGE (OUTPATIENT)
Dept: NEUROLOGY | Facility: CLINIC | Age: 44
End: 2024-02-29

## 2024-02-29 ENCOUNTER — PATIENT MESSAGE (OUTPATIENT)
Dept: ADMINISTRATIVE | Age: 44
End: 2024-02-29

## 2024-02-29 DIAGNOSIS — R29.898 RIGHT ARM WEAKNESS: ICD-10-CM

## 2024-02-29 DIAGNOSIS — G62.9 SMALL FIBER NEUROPATHY: Primary | ICD-10-CM

## 2024-02-29 NOTE — TELEPHONE ENCOUNTER
Called patient to verify what she was asking for. She was asking for sedation d/t the imaging being 3 hours long. Advised that patients referral is still pending for her imaging and she should reschedule at this time until approved. Patient will call back regarding sedation for the exams.

## 2024-02-29 NOTE — TELEPHONE ENCOUNTER
Maddy  online to initiate authorization    MRI SPINE THORACIC (W+WO) (CPT=72157)   MRI SPINE CERVICAL (W+WO) (CPT=72156)        Referral #: [id number] , [id number]      Scheduled For: 03/01/2024    Status: pending authorization > To discuss this case with the reviewer, contact Sivakumar at [phone]  .    Use Reference Case Number: [id number]             Clinical notes sent for review.     Patient notified of pending status via [employer].     Appt Desk > Noted

## 2024-02-29 NOTE — TELEPHONE ENCOUNTER
From: Morelia Camarillo  To: Jeannette Bruno  Sent: 2/29/2024 11:26 AM CST  Subject: MRI's    Hi Dr. Bruno,    I know you aren't in until tomorrow and you likely have all of this information waiting for your review. Passing along what [facility] shared. They are awaiting a peer to peer review to authorize the stat MRI's. The order ID# [id number] and the phone number to call is [phone]. I'm hoping they can complete the authorization in the AM so that I can move forward with the MRI's in the late afternoon. This is by far the worst my condition has been: I'm weak, have persistent nerve pain, can only walk short distances (often with shortness of breath when I do), have mild pressure in my head, recurrent lightheadedness, can only work a very short time at the computer, cannot drive. This along with the new sx of blurry vision on Sunday and short disorientation on Tuesday are really concerning.    Thank you for everything you are doing to figure this out with me.

## 2024-03-01 ENCOUNTER — TELEPHONE (OUTPATIENT)
Dept: NEUROLOGY | Facility: CLINIC | Age: 44
End: 2024-03-01

## 2024-03-01 ENCOUNTER — HOSPITAL ENCOUNTER (OUTPATIENT)
Dept: MRI IMAGING | Facility: HOSPITAL | Age: 44
Discharge: HOME | End: 2024-03-01
Attending: STUDENT IN AN ORGANIZED HEALTH CARE EDUCATION/TRAINING PROGRAM
Payer: COMMERCIAL

## 2024-03-01 DIAGNOSIS — G50.0 RIGHT TRIGEMINAL NEURALGIA: ICD-10-CM

## 2024-03-01 DIAGNOSIS — R20.2 PARESTHESIAS: ICD-10-CM

## 2024-03-01 DIAGNOSIS — G62.9 SMALL FIBER NEUROPATHY: ICD-10-CM

## 2024-03-01 DIAGNOSIS — R29.898 RIGHT ARM WEAKNESS: ICD-10-CM

## 2024-03-01 DIAGNOSIS — R29.2 HYPERREFLEXIA: ICD-10-CM

## 2024-03-01 PROCEDURE — 70553 MRI BRAIN STEM W/O & W/DYE: CPT | Performed by: STUDENT IN AN ORGANIZED HEALTH CARE EDUCATION/TRAINING PROGRAM

## 2024-03-01 PROCEDURE — A9575 INJ GADOTERATE MEGLUMI 0.1ML: HCPCS | Performed by: STUDENT IN AN ORGANIZED HEALTH CARE EDUCATION/TRAINING PROGRAM

## 2024-03-01 PROCEDURE — 70546 MR ANGIOGRAPH HEAD W/O&W/DYE: CPT | Performed by: STUDENT IN AN ORGANIZED HEALTH CARE EDUCATION/TRAINING PROGRAM

## 2024-03-01 PROCEDURE — 70549 MR ANGIOGRAPH NECK W/O&W/DYE: CPT | Performed by: STUDENT IN AN ORGANIZED HEALTH CARE EDUCATION/TRAINING PROGRAM

## 2024-03-01 RX ORDER — DIPHENHYDRAMINE HYDROCHLORIDE 50 MG/ML
10 INJECTION, SOLUTION INTRAMUSCULAR; INTRAVENOUS
Status: COMPLETED | OUTPATIENT
Start: 2024-03-01 | End: 2024-03-01

## 2024-03-01 RX ADMIN — DIPHENHYDRAMINE HYDROCHLORIDE 10 ML: 50 INJECTION, SOLUTION INTRAMUSCULAR; INTRAVENOUS at 16:51:00

## 2024-03-01 NOTE — TELEPHONE ENCOUNTER
Patient was advised the only thing approved was for the MRI of brain and MRA. Advised patient the cervical and thoracic is still pending and one we receive an outcome will notify patient.

## 2024-03-01 NOTE — TELEPHONE ENCOUNTER
Need a approval for mri and need authorization   code for insurance can be reach on my chat or [phone]

## 2024-03-01 NOTE — TELEPHONE ENCOUNTER
Pt has imaging appt today. Tech has questions regarding imagining order. Tech 's best call back number is [phone]. Endorsed to RN for Provider.

## 2024-03-05 ENCOUNTER — TELEPHONE (OUTPATIENT)
Dept: NEUROLOGY | Facility: CLINIC | Age: 44
End: 2024-03-05

## 2024-03-13 ENCOUNTER — LAB ENCOUNTER (OUTPATIENT)
Dept: LAB | Age: 44
End: 2024-03-13
Attending: STUDENT IN AN ORGANIZED HEALTH CARE EDUCATION/TRAINING PROGRAM
Payer: COMMERCIAL

## 2024-03-13 DIAGNOSIS — R29.2 HYPERREFLEXIA: ICD-10-CM

## 2024-03-13 DIAGNOSIS — G50.0 RIGHT TRIGEMINAL NEURALGIA: ICD-10-CM

## 2024-03-13 DIAGNOSIS — G62.9 SMALL FIBER NEUROPATHY: ICD-10-CM

## 2024-03-13 DIAGNOSIS — E83.52 HYPERCALCEMIA: ICD-10-CM

## 2024-03-13 DIAGNOSIS — R20.2 PARESTHESIAS: ICD-10-CM

## 2024-03-13 DIAGNOSIS — R29.898 RIGHT ARM WEAKNESS: ICD-10-CM

## 2024-03-13 LAB
C3 SERPL-MCNC: 143 MG/DL (ref 90–180)
C4 SERPL-MCNC: 37.1 MG/DL (ref 10–40)
CK SERPL-CCNC: 34 U/L
EST. AVERAGE GLUCOSE BLD GHB EST-MCNC: 103 MG/DL (ref 68–126)
HBA1C MFR BLD: 5.2 % (ref ?–5.7)
HBV CORE AB SERPL QL IA: NONREACTIVE
HBV SURFACE AB SER QL: REACTIVE
HBV SURFACE AB SERPL IA-ACNC: 118.53 MIU/ML
HBV SURFACE AG SER-ACNC: <0.1 [IU]/L
HBV SURFACE AG SERPL QL IA: NONREACTIVE
RHEUMATOID FACT SERPL-ACNC: <10 IU/ML (ref ?–15)

## 2024-03-13 PROCEDURE — 86803 HEPATITIS C AB TEST: CPT

## 2024-03-13 PROCEDURE — 86704 HEP B CORE ANTIBODY TOTAL: CPT

## 2024-03-13 PROCEDURE — 83516 IMMUNOASSAY NONANTIBODY: CPT

## 2024-03-13 PROCEDURE — 86160 COMPLEMENT ANTIGEN: CPT

## 2024-03-13 PROCEDURE — 82595 ASSAY OF CRYOGLOBULIN: CPT

## 2024-03-13 PROCEDURE — 86038 ANTINUCLEAR ANTIBODIES: CPT

## 2024-03-13 PROCEDURE — 86225 DNA ANTIBODY NATIVE: CPT

## 2024-03-13 PROCEDURE — 86706 HEP B SURFACE ANTIBODY: CPT

## 2024-03-13 PROCEDURE — 36415 COLL VENOUS BLD VENIPUNCTURE: CPT

## 2024-03-13 PROCEDURE — 86431 RHEUMATOID FACTOR QUANT: CPT

## 2024-03-13 PROCEDURE — 87389 HIV-1 AG W/HIV-1&-2 AB AG IA: CPT

## 2024-03-13 PROCEDURE — 82550 ASSAY OF CK (CPK): CPT

## 2024-03-13 PROCEDURE — 83036 HEMOGLOBIN GLYCOSYLATED A1C: CPT

## 2024-03-13 PROCEDURE — 87340 HEPATITIS B SURFACE AG IA: CPT

## 2024-03-13 PROCEDURE — 86037 ANCA TITER EACH ANTIBODY: CPT

## 2024-03-13 PROCEDURE — 82330 ASSAY OF CALCIUM: CPT

## 2024-03-14 LAB
ANTI-MPO ANTIBODIES: <0.2 UNITS
ANTI-PR3 ANTIBODIES: <0.2 UNITS
DSDNA IGG SERPL IA-ACNC: 3.1 IU/ML
ENA AB SER QL IA: 0.1 UG/L
ENA AB SER QL IA: NEGATIVE
HCV AB: NON REACTIVE
LC CALCIUM, IONIZED: 4.9 MG/DL

## 2024-03-17 ENCOUNTER — HOSPITAL ENCOUNTER (OUTPATIENT)
Dept: MRI IMAGING | Facility: HOSPITAL | Age: 44
End: 2024-03-17
Attending: STUDENT IN AN ORGANIZED HEALTH CARE EDUCATION/TRAINING PROGRAM
Payer: COMMERCIAL

## 2024-03-17 ENCOUNTER — HOSPITAL ENCOUNTER (OUTPATIENT)
Dept: MRI IMAGING | Facility: HOSPITAL | Age: 44
Discharge: HOME | End: 2024-03-17
Attending: STUDENT IN AN ORGANIZED HEALTH CARE EDUCATION/TRAINING PROGRAM
Payer: COMMERCIAL

## 2024-03-17 DIAGNOSIS — R20.2 PARESTHESIAS: ICD-10-CM

## 2024-03-17 DIAGNOSIS — R29.898 RIGHT ARM WEAKNESS: ICD-10-CM

## 2024-03-17 DIAGNOSIS — R29.2 HYPERREFLEXIA: ICD-10-CM

## 2024-03-17 DIAGNOSIS — G50.0 RIGHT TRIGEMINAL NEURALGIA: ICD-10-CM

## 2024-03-17 DIAGNOSIS — G62.9 SMALL FIBER NEUROPATHY: ICD-10-CM

## 2024-03-17 PROCEDURE — 72157 MRI CHEST SPINE W/O & W/DYE: CPT | Performed by: STUDENT IN AN ORGANIZED HEALTH CARE EDUCATION/TRAINING PROGRAM

## 2024-03-17 PROCEDURE — 72156 MRI NECK SPINE W/O & W/DYE: CPT | Performed by: STUDENT IN AN ORGANIZED HEALTH CARE EDUCATION/TRAINING PROGRAM

## 2024-03-17 PROCEDURE — A9575 INJ GADOTERATE MEGLUMI 0.1ML: HCPCS | Performed by: STUDENT IN AN ORGANIZED HEALTH CARE EDUCATION/TRAINING PROGRAM

## 2024-03-17 RX ORDER — GADOTERATE MEGLUMINE 376.9 MG/ML
20 INJECTION INTRAVENOUS
Status: COMPLETED | OUTPATIENT
Start: 2024-03-17 | End: 2024-03-17

## 2024-03-17 RX ADMIN — GADOTERATE MEGLUMINE 11 ML: 376.9 INJECTION INTRAVENOUS at 12:17:00

## 2024-03-19 ENCOUNTER — TELEPHONE (OUTPATIENT)
Dept: NEUROLOGY | Facility: CLINIC | Age: 44
End: 2024-03-19

## 2024-03-19 DIAGNOSIS — R29.898 RIGHT LEG WEAKNESS: ICD-10-CM

## 2024-03-19 DIAGNOSIS — R29.2 HYPERREFLEXIA: ICD-10-CM

## 2024-03-19 DIAGNOSIS — G95.9 CERVICAL MYELOPATHY (HCC): Primary | ICD-10-CM

## 2024-03-19 DIAGNOSIS — G62.9 SMALL FIBER NEUROPATHY: ICD-10-CM

## 2024-03-19 NOTE — TELEPHONE ENCOUNTER
Moderate degenerative disc bulge at c5-6 with moderate central canal stenosis and mild foraminal narrowing bilat, c6-7 moderate degenerative disc bulge with moderate central canal stenosis, moderate right and mild left neural foraminal narrowing.     MRI SPINE THORACIC (W+WO) (CPT=72157)    Result Date: 3/17/2024  CONCLUSION:  Mild posterior degenerative disc bulging at T7-8.  There is no significant central canal stenosis or nerve root impingement.   LOCATION:  [facility]    Dictated by (CST): Luis Peralta MD on 3/17/2024 at 1:18 PM     Finalized by (CST): Luis Peralta MD on 3/17/2024 at 1:20 PM       MRI SPINE CERVICAL (W+WO) (CPT=72156)    Result Date: 3/17/2024  CONCLUSION:   1. Moderate degenerative disc bulge/osteophyte complex at C5-6 causing moderate central canal stenosis and mild bilateral neural foraminal narrowing.  2. There have disc bulge/osteophyte complex at C6-7 eccentric to the right lateral aspect of the disc space.  There is moderate central canal stenosis and moderate right and mild left neural foraminal narrowing.   LOCATION:  [facility]   Dictated by (CST): Luis Peralta MD on 3/17/2024 at 1:04 PM     Finalized by (CST): Luis Peralta MD on 3/17/2024 at 1:09 PM       MRI BRAIN MRA HEAD+MRA NECK (ALL W+WO) (CPT=70553/04892/85789)    Result Date: 3/1/2024  CONCLUSION:  No acute abnormality on MR angiography of the head and neck.   LOCATION:  [facility]   Dictated by (CST): Adria Gutierrez MD on 3/01/2024 at 5:24 PM     Finalized by (CST): Adria Gutierrez MD on 3/01/2024 at 5:35 PM       CT BRAIN OR HEAD (28116)    Result Date: 2/26/2024  CONCLUSION:  No acute intracranial abnormality. If there is clinical concern for acute ischemia/infarction, an MRI of the brain would be recommended for further evaluation.    LOCATION:  [id number]   Dictated by (CST): Stromberg, LeRoy, MD on 2/26/2024 at 3:50 PM     Finalized by (CST): Stromberg, LeRoy, MD on 2/26/2024 at 3:52 PM              \    Component      Latest Ref Rng  2/21/2024 2/26/2024   WBC      4.0 - 11.0 x10(3) uL  7.9    RBC      3.80 - 5.30 x10(6)uL  4.72    Hemoglobin      12.0 - 16.0 g/dL  13.3    Hematocrit      35.0 - 48.0 %  40.6    Platelet Count      150.0 - 450.0 10(3)uL  311.0    MCV      80.0 - 100.0 fL  86.0    MCH      26.0 - 34.0 pg  28.2    MCHC      31.0 - 37.0 g/dL  32.8    RDW      %  13.1    Prelim Neutrophil Abs      1.50 - 7.70 x10 (3) uL  6.77    Neutrophils Absolute      1.50 - 7.70 x10(3) uL  6.77    Lymphocytes Absolute      1.00 - 4.00 x10(3) uL  0.58 (L)    Monocytes Absolute      0.10 - 1.00 x10(3) uL  0.45    Eosinophils Absolute      0.00 - 0.70 x10(3) uL  0.01    Basophils Absolute      0.00 - 0.20 x10(3) uL  0.05    Immature Granulocyte Absolute      0.00 - 1.00 x10(3) uL  0.04    Neutrophils %      %  85.8    Lymphocytes %      %  7.3    Monocytes %      %  5.7    Eosinophils %      %  0.1    Basophils %      %  0.6    Immature Granulocyte %      %  0.5    Glucose      70 - 99 mg/dL 101 (H)  94    Sodium      136 - 145 mmol/L 139  140    Potassium      3.5 - 5.1 mmol/L 4.2  4.2    Chloride      98 - 112 mmol/L 103  107    Carbon Dioxide, Total      21.0 - 32.0 mmol/L 30.0  26.0    ANION GAP      0 - 18 mmol/L 6  7    BUN      9 - 23 mg/dL 13  12    CREATININE      0.55 - 1.02 mg/dL 0.83  0.73    CALCIUM      8.5 - 10.1 mg/dL 10.2 (H)  9.7    CALCULATED OSMOLALITY      275 - 295 mOsm/kg 288  290    EGFR      >=60 mL/min/1.73m2 90  105    AST (SGOT)      15 - 37 U/L  14 (L)    ALT (SGPT)      13 - 56 U/L  13    ALKALINE PHOSPHATASE      37 - 98 U/L  71    Total Bilirubin      0.1 - 2.0 mg/dL  0.4    PROTEIN, TOTAL      6.4 - 8.2 g/dL  7.6    Albumin      3.4 - 5.0 g/dL  4.1    Globulin      2.8 - 4.4 g/dL  3.5    A/G Ratio      1.0 - 2.0   1.2    Patient Fasting for BMP? No     Hbsag Screen Index     HBSAg Screen      Nonreactive       HEPATITIS B SURFACE AB QUAL      Reactive       HEPATITIS B SURFACE AB QUANT      mIU/mL     HEPATITIS B CORE  AB, TOTAL      Nonreactive       MYELOPEROX ANTIBODIES, IGG      0.0 - 0.9 units     SERINE PROTEASE3, IGG      0.0 - 0.9 units     Cytoplasmic (C-ANCA)      Neg:<1:20 titer     Perinuclear (P-ANCA)      Neg:<1:20 titer     ATYPICAL PANCA      Neg:<1:20 titer     Expanded GUNJAN Antibody Screen, IGG      <0.7 ug/l     Anti-dsDNA antibody      <10 IU/mL     Connective Tissue Disease Screen Interpretation      Negative      HEMOGLOBIN A1c      <5.7 %     ESTIMATED AVERAGE GLUCOSE      68 - 126 mg/dL     HCG URINE QUALITATIVE      Negative  Negative     Troponin I (High Sensitivity)      <=54 ng/L  4    HCG, Serum Qualitative (S)      Negative   Negative    CALCIUM, IONIZED      4.5 - 5.6 mg/dL     CK      26 - 192 U/L     COMPLEMENT C4      10.0 - 40.0 mg/dL     COMPLEMENT C3      90.0 - 180.0 mg/dL     HCV AB      Non Reactive      RHEUMATOID FACTOR      <15 IU/mL     Cryoglob Ql      None detected      HIV Antigen Antibody Combo      Non-Reactive      HCV Neg Interp       Component      Latest Ref [facility] 3/13/2024   WBC      4.0 - 11.0 x10(3) uL    RBC      3.80 - 5.30 x10(6)uL    Hemoglobin      12.0 - 16.0 g/dL    Hematocrit      35.0 - 48.0 %    Platelet Count      150.0 - 450.0 10(3)uL    MCV      80.0 - 100.0 fL    MCH      26.0 - 34.0 pg    MCHC      31.0 - 37.0 g/dL    RDW      %    Prelim Neutrophil Abs      1.50 - 7.70 x10 (3) uL    Neutrophils Absolute      1.50 - 7.70 x10(3) uL    Lymphocytes Absolute      1.00 - 4.00 x10(3) uL    Monocytes Absolute      0.10 - 1.00 x10(3) uL    Eosinophils Absolute      0.00 - 0.70 x10(3) uL    Basophils Absolute      0.00 - 0.20 x10(3) uL    Immature Granulocyte Absolute      0.00 - 1.00 x10(3) uL    Neutrophils %      %    Lymphocytes %      %    Monocytes %      %    Eosinophils %      %    Basophils %      %    Immature Granulocyte %      %    Glucose      70 - 99 mg/dL    Sodium      136 - 145 mmol/L    Potassium      3.5 - 5.1 mmol/L    Chloride      98 - 112 mmol/L     Carbon Dioxide, Total      21.0 - 32.0 mmol/L    ANION GAP      0 - 18 mmol/L    BUN      9 - 23 mg/dL    CREATININE      0.55 - 1.02 mg/dL    CALCIUM      8.5 - 10.1 mg/dL    CALCULATED OSMOLALITY      275 - 295 mOsm/kg    EGFR      >=60 mL/min/1.73m2    AST (SGOT)      15 - 37 U/L    ALT (SGPT)      13 - 56 U/L    ALKALINE PHOSPHATASE      37 - 98 U/L    Total Bilirubin      0.1 - 2.0 mg/dL    PROTEIN, TOTAL      6.4 - 8.2 g/dL    Albumin      3.4 - 5.0 g/dL    Globulin      2.8 - 4.4 g/dL    A/G Ratio      1.0 - 2.0     Patient Fasting for BMP?    Hbsag Screen Index <0.10    HBSAg Screen      Nonreactive   Nonreactive    HEPATITIS B SURFACE AB QUAL      Reactive   Reactive    HEPATITIS B SURFACE AB QUANT      mIU/mL 118.53    HEPATITIS B CORE AB, TOTAL      Nonreactive   Nonreactive    MYELOPEROX ANTIBODIES, IGG      0.0 - 0.9 units <0.2    SERINE PROTEASE3, IGG      0.0 - 0.9 units <0.2    Cytoplasmic (C-ANCA)      Neg:<1:20 titer <1:20    Perinuclear (P-ANCA)      Neg:<1:20 titer <1:20    ATYPICAL PANCA      Neg:<1:20 titer <1:20    Expanded GUNJAN Antibody Screen, IGG      <0.7 ug/l 0.10    Anti-dsDNA antibody      <10 IU/mL 3.1    Connective Tissue Disease Screen Interpretation      Negative  Negative    HEMOGLOBIN A1c      <5.7 % 5.2    ESTIMATED AVERAGE GLUCOSE      68 - 126 mg/dL 103    HCG URINE QUALITATIVE      Negative     Troponin I (High Sensitivity)      <=54 ng/L    HCG, Serum Qualitative (S)      Negative     CALCIUM, IONIZED      4.5 - 5.6 mg/dL 4.9    CK      26 - 192 U/L 34    COMPLEMENT C4      10.0 - 40.0 mg/dL 37.1    COMPLEMENT C3      90.0 - 180.0 mg/dL 143.0    HCV AB      Non Reactive  Non Reactive    RHEUMATOID FACTOR      <15 IU/mL <10    Cryoglob Ql      None detected  Comment    HIV Antigen Antibody Combo      Non-Reactive  Non-Reactive    HCV Neg Interp Comment       Legend:  (H) High  (L) Low

## 2024-03-20 NOTE — TELEPHONE ENCOUNTER
Spoke to Morelia joy. She will see NRSG. Etiology of small fiber neuropathy remains unclear. Sending more labwork and CXR.   Jeannette Bruno, DO

## 2024-04-02 ENCOUNTER — OFFICE VISIT (OUTPATIENT)
Dept: SURGERY | Facility: CLINIC | Age: 44
End: 2024-04-02
Payer: COMMERCIAL

## 2024-04-02 ENCOUNTER — HOSPITAL ENCOUNTER (OUTPATIENT)
Dept: GENERAL RADIOLOGY | Facility: HOSPITAL | Age: 44
Discharge: HOME | End: 2024-04-02
Attending: STUDENT IN AN ORGANIZED HEALTH CARE EDUCATION/TRAINING PROGRAM
Payer: COMMERCIAL

## 2024-04-02 VITALS
WEIGHT: 125 LBS | DIASTOLIC BLOOD PRESSURE: 82 MMHG | SYSTOLIC BLOOD PRESSURE: 116 MMHG | BODY MASS INDEX: 23 KG/M2 | HEIGHT: 62 IN | HEART RATE: 109 BPM

## 2024-04-02 DIAGNOSIS — R13.10 DYSPHAGIA, UNSPECIFIED TYPE: ICD-10-CM

## 2024-04-02 DIAGNOSIS — R29.898 RIGHT ARM WEAKNESS: ICD-10-CM

## 2024-04-02 DIAGNOSIS — M50.30 DDD (DEGENERATIVE DISC DISEASE), CERVICAL: ICD-10-CM

## 2024-04-02 DIAGNOSIS — D18.09 HEMANGIOMA OF OTHER SITES: ICD-10-CM

## 2024-04-02 DIAGNOSIS — R26.81 GAIT INSTABILITY: ICD-10-CM

## 2024-04-02 DIAGNOSIS — R29.898 HAND WEAKNESS: ICD-10-CM

## 2024-04-02 DIAGNOSIS — R29.898 WEAKNESS OF BOTH LOWER EXTREMITIES: ICD-10-CM

## 2024-04-02 DIAGNOSIS — G99.2 MYELOPATHY CONCURRENT WITH AND DUE TO SPINAL STENOSIS OF CERVICAL REGION (HCC): Primary | ICD-10-CM

## 2024-04-02 DIAGNOSIS — M48.02 MYELOPATHY CONCURRENT WITH AND DUE TO SPINAL STENOSIS OF CERVICAL REGION (HCC): Primary | ICD-10-CM

## 2024-04-02 DIAGNOSIS — G62.9 SMALL FIBER NEUROPATHY: ICD-10-CM

## 2024-04-02 DIAGNOSIS — R20.0 NUMBNESS AND TINGLING: ICD-10-CM

## 2024-04-02 DIAGNOSIS — M48.02 FORAMINAL STENOSIS OF CERVICAL REGION: ICD-10-CM

## 2024-04-02 DIAGNOSIS — R20.2 NUMBNESS AND TINGLING: ICD-10-CM

## 2024-04-02 PROBLEM — D84.9 IMMUNOCOMPROMISED STATE (HCC): Status: ACTIVE | Noted: 2023-02-02

## 2024-04-02 PROBLEM — M06.9 RHEUMATOID ARTHRITIS (HCC): Status: ACTIVE | Noted: 2022-01-25

## 2024-04-02 PROCEDURE — 71046 X-RAY EXAM CHEST 2 VIEWS: CPT | Performed by: STUDENT IN AN ORGANIZED HEALTH CARE EDUCATION/TRAINING PROGRAM

## 2024-04-02 NOTE — PROGRESS NOTES
New patient:  Reason for visit:   Referred by Dr Tutu PORTER sided weakness/pain/numbness     Estimated time of onset: 2 years     Numeric Rating Scale:        Pain at Present: 6/10                                                                                                              Past Treatments for Current Pain Condition:     Leflunomide/ steroids- some relief     Prior diagnostic testing related to this condition:    MRI spine cervical/thoracic DOS 03/17/24

## 2024-04-02 NOTE — H&P
Patient: Morelia Camarillo  Medical Record Number: [id number]  YOB: 1980  PCP: Jeannette Bruno DO    Referring Physician: Dr. Bruno  Reason for visit: Right sided weakness, paresthesias    Dear Dr. Bruno,  Thank you very much for requesting this consultation. I had the opportunity to evaluate and initiate care for your patient today, as per your request.    HISTORY OF CHIEF COMPLAINT:    Morelia Camarillo is a very pleasant 43 year old female, with a pertinent PMH of small fiber neuropathy, per neurology affecting the RUE and RLE.   Presents today for a complaint of: Right sided weakness, paresthesias  The patient has been following with neurology for right-sided numbness, paresthesias and weakness, onset approximately 2 years prior per neurology notes.  EMG unrevealing.  Patient with a sweat test concerning for small fiber neuropathy of right upper and lower extremity.  Per neurology notes, she saw rheumatology for rheumatologic evaluation which has been negative so far.  She is planning to see an autonomic specialist as well.  Onset: Symptoms began around 16 years of age. Was diagnosed with IBS, fibromyalgia. Spent most of life managing her symptoms. 3 years ago, medical event when traveling. Had a sunburn, with inflammatory response from the waist down. This cleared over 2-3 weeks. States a few months later, having cyclical rheumatologic flares. Saw rheumatology around 1/2022. Concern for autoimmune arthritis. A week after seeing rheumatology, had \"stroke like\" symptoms. Feels like there is a line drawn down her center, everything on the right feels \"different.\" States stress around that time but no other inciting event. She is also believed to have dysautonomia.   Location: Right face, tongue, RUE, RLE. States as if a line was drawn down her center, affecting her right side. Pain of the right entire aspect of the face, which is constant. Sharp pain in the jaw.   Duration/Timing: Symptoms have been  occurring for decades.  Pain is constant but waxes and wanes  Character: Weakness, numbness, tingling, pain.   Rate: Patient rates the pain  6/10.   Severity: With a change in temperature and physical activity, aggravates her symptoms.   Patient Denies bladder/bowel incontinence/retention.   Has neck and low back pain. Gait instability. Had 3 \"drop falls\" prior to starting leflunomide. No falls since beginning this medication therapy.   Pain is achy all over the RUE, no described shooting. The entire right hand and all fingers are affects.   Has a \"neuropathy sensation\" of BUE and BLE intermittently. Achyness of the left side with fibromyalgia.   Has an overall body fatigue.   All her symptoms are gradually progressing over time.   About 1 month ago, lost function in her RUE, \"on and off.\" States this was secondary to pain and weakness. She was unable to type.     Past Medical History:   Diagnosis Date    Anxiety     Connective tissue disease (HCC)     Fibromyalgia     IBS (irritable bowel syndrome)       Past Surgical History:   Procedure Laterality Date            Family History   Problem Relation Age of Onset    Heart Disorder Maternal Grandfather     Diabetes Paternal Grandmother       Social History     Socioeconomic History    Marital status:    Tobacco Use    Smoking status: Never    Smokeless tobacco: Never   Vaping Use    Vaping Use: Never used   Substance and Sexual Activity    Alcohol use: Not Currently    Drug use: No   Other Topics Concern    Caffeine Concern Yes     Comment: coffee    Exercise Yes     Comment: stretching      No Known Allergies   Current Medications:  Current Outpatient Medications   Medication Sig Dispense Refill    predniSONE 5 MG Oral Tab Take 1 tablet (5 mg total) by mouth daily.      predniSONE 1 MG Oral Tab Take 4 tablets (4 mg total) by mouth daily.      ibuprofen (ADVIL) 200 MG Oral Tab Take 4 tablets (800 mg total) by mouth as needed for Pain.      leflunomide  20 MG Oral Tab Take 1 tablet (20 mg total) by mouth daily.      BuPROPion HCl ER, XL, 150 MG Oral Tablet 24 Hr 1 tablet (150 mg total)  in the morning and 1 tablet (150 mg total) before bedtime.  0    alprazolam 0.25 MG Oral Tab Take 1 tablet (0.25 mg total) by mouth nightly as needed for Sleep.          REVIEW OF SYSTEMS   Comprehensive review of systems done. Negative except what is outlined in the above HPI.     PHYSICAL EXAMIMATION    vitals were not taken for this visit.   GENERAL: Very pleasant patient is in no apparent distress. Sitting comfortably in the examination chair.   HEENT: Normocephalic, atraumatic.  RESPIRATORY RATE: Easy and Even  SKIN: Warm and dry  NEURO: Awake, alert and orientated. Speech fluent, comprehension intact, answering questions appropriately.     SPINE:  Gait/Coordination: Gait deferred.   Palpation: + tenderness over C7. Mild tenderness over the mid thoracic and lumbar region  Palpation Right   (POS or NEG) Left   (POS or NEG)   Paraspinal Muscles, Cervical     Paraspinal Muscles, Lumbar Neg Neg   Sacroiliac Joint Region Neg Neg   Trochanteric Bursa Region Neg Neg   Patellar Bursa Region Neg Neg     Upper Extremity Strength:    Deltoid  Biceps  Triceps  W.extension  F.extension Thumb adduction Thumb abduction  Intrinsics      Right 5 5 5 5  5 5 5 5     Left 5 5 5 5 5 5 5 5   Lower Extremity Strength:     Iliopsoas  Hamstrings   Quads    D-flexion P-flexion Great Toe   Right       5         5       5         5 5 5   Left       5         5       5         5 5 5   DTRs:       Biceps    Triceps   Brachioradialis     Patellar    Right       3+         2+            2+         2+    Left       3+         2+             2+         2+      Tests:   Test Right   (POS or NEG) Left   (POS or NEG)   Hanson's Sign Neg Very mild +   Clonus Neg Neg     DATA:   None    IMAGING:     Study Result    Narrative   PROCEDURE:  MRI SPINE CERVICAL (W+WO) (CPT=72156)     COMPARISON:  [facility] , MR, MRI BRAIN  MRA HEAD+MRA NECK (ALL W+WO) (CPT=70553/74030/70944), 3/01/2024, 3:33 PM.     INDICATIONS:  R29.2 Hyperreflexia G50.0 Right trigeminal neuralgia R20.2 Paresthesias G62.9 Small fiber neuropathy R29.898 Right arm weakness     TECHNIQUE:  Multiplanar T1 and T2 weighted images including fat suppression sequences.  Images acquired in sagittal and axial planes.  Intravenous gadolinium was administered followed by multiplanar post-infusion T1 weighted sequences.       PATIENT STATED HISTORY:(As transcribed by Technologist)  Pt stated that she having a MRI of her cervical and thoracic spine without and with contrast today for right side weakness.      CONTRAST USED:  11 mL of Dotarem     FINDINGS:    CERVICAL DISC LEVELS:  C2-C3:  No significant disc/facet abnormality, spinal stenosis, or foraminal narrowing.  C3-C4:  No significant disc/facet abnormality, spinal stenosis, or foraminal narrowing.  C4-C5:  No significant disc abnormality, spinal stenosis, or foraminal narrowing.  There is mild left-sided facet joint degenerative change.  C5-C6:  There is moderate broad-based degenerative disc bulge/osteophyte complex with effacement anterior CSF space.  AP diameter canal is narrowed to 7 mm consistent moderate central canal stenosis.  There is mild bilateral neural foraminal narrowing.    Facet joints demonstrate mild left greater than right facet joint degenerative change.  C6-C7:  There is moderate degenerative disc bulge/osteophyte complex slightly eccentric to the right lateral aspect of the disc space.  AP diameter canal is normal to 7.4 mm consistent with moderate central canal stenosis.  There is moderate right and  mild left neural foraminal narrowing.  Facet joints are unremarkable.  C7-T1:  No significant disc/facet abnormality, spinal stenosis, or foraminal narrowing.          CRANIOCERVICAL AREA:  Normal foramen magnum with no Chiari malformation.    PARASPINAL AREA:  Normal with no visible mass.    BONY  STRUCTURES:  No fracture, pars defect, or osseous lesion.    CORD:  Normal caliber, contour and signal intensity.  No evidence of abnormal enhancement within the cervical cord.                   Impression   CONCLUSION:       1. Moderate degenerative disc bulge/osteophyte complex at C5-6 causing moderate central canal stenosis and mild bilateral neural foraminal narrowing.     2. There have disc bulge/osteophyte complex at C6-7 eccentric to the right lateral aspect of the disc space.  There is moderate central canal stenosis and moderate right and mild left neural foraminal narrowing.        LOCATION:  [facility]        Dictated by (CST): Luis Peralta MD on 3/17/2024 at 1:04 PM      Finalized by (CST): Luis Peralta MD on 3/17/2024 at 1:09 PM     Study Result    Narrative   PROCEDURE:  MRI SPINE THORACIC (W+WO) (CPT=72157)     COMPARISON:  None.     INDICATIONS:  R29.2 Hyperreflexia G50.0 Right trigeminal neuralgia R20.2 Paresthesias G62.9 Small fiber neuropathy R29.898 Right arm weakness     TECHNIQUE:  Multiplanar T1 and T2 weighted images including fat suppression sequences.  Images acquired in sagittal and axial planes. Intravenous gadolinium was administered followed by multiplanar post-infusion T1 weighted sequences.       PATIENT STATED HISTORY:(As transcribed by Technologist)  Pt stated that she having a MRI of her cervical and thoracic spine without and with contrast today for right side weakness.      CONTRAST USED:  11 mL of Dotarem     FINDINGS:    CORD:  Normal caliber, contour, and signal.  The conus terminates at a normal level.  No abnormal contrast enhancement.    BONY STRUCTURES:  Normal alignment without significant spondylosis or scoliosis.  Hemangioma within T10.  DISCS:  Mild posterior degenerative disc bulging at T7-8.  Otherwise, no significant disc/facet abnormality, spinal stenosis, or foraminal narrowing.  OTHER:  Unremarkable paraspinous region with no visible mass.                      Impression   CONCLUSION:  Mild posterior degenerative disc bulging at T7-8.  There is no significant central canal stenosis or nerve root impingement.        LOCATION:  [facility]           Dictated by (CST): Luis Peralta MD on 3/17/2024 at 1:18 PM      Finalized by (CST): Luis Peralta MD on 3/17/2024 at 1:20 PM       MEDICAL DECISION MAKING:     ASSESSMENT and PLAN:      PLAN:   1. Medication: None prescribed  2. Imaging:    - Reviewed today:    - MRI cervical spine:     -Cervical degenerative disc disease most prominent at C5-6 and C6-7.  Central disc bulge at C5-6 contributing to spinal stenosis.  Right paracentral disc protrusion at C6/7 contributing to foraminal narrowing and spinal stenosis.    - MRI thoracic spine:     - Thoracic DDD without significant spinal stenosis. Heman   - Ordered today:    - XR cervical spine:     - AP and lateral, flexion and extension     - XR swallow study:     - Further assess dysphagia   3. Follow up once imaging is complete or call or follow up sooner or go to the ED for any new, worsening or concerning signs or symptoms     Dr. Jimenez and OTIS reviewed imaging and discussed the plan. Dr. Jimenez agrees with the plan. Dr. Jimenez and OTIS reviewed imaging and discussed the plan with the patient. The patient agrees with the plan, verbalized understanding and is appreciative. All questions were sought out and thoroughly answered to satisfaction.       Total visit time: 45 min  More than 50% spent coordinating care, providing patient education, reviewing imaging, discussing further imaging and counseling.    Thank you very much for the kind referral.  Respectfully yours,    Steffany Bañuelos M.S., CANDICE  [address], [address] [address]  [phone]  4/2/2024 2:59 PM    Dragon speech recognition software was used to prepare this note. If a word or phrase is confusing, it is likely due to a failure of recognition.  Please contact me with any questions or clarifications.

## 2024-04-02 NOTE — PATIENT INSTRUCTIONS
Refill policies:    Allow 2-3 business days for refills; controlled substances may take longer.  Contact your pharmacy at least 5 days prior to running out of medication and have them send an electronic request or submit request through the “request refill” option in your [email or web address] account.  Refills are not addressed on weekends; covering physicians do not authorize routine medications on weekends.  No narcotics or controlled substances are refilled after noon on Fridays or by on call physicians.  By law, narcotics must be electronically prescribed.  A 30 day supply with no refills is the maximum allowed.  If your prescription is due for a refill, you may be due for a follow up appointment.  To best provide you care, patients receiving routine medications need to be seen at least once a year.  Patients receiving narcotic/controlled substance medications need to be seen at least once every 3 months.  In the event that your preferred pharmacy does not have the requested medication in stock (e.g. Backordered), it is your responsibility to find another pharmacy that has the requested medication available.  We will gladly send a new prescription to that pharmacy at your request.    Scheduling Tests:    If your physician has ordered radiology tests such as MRI or CT scans, please contact Central Scheduling at [phone] right away to schedule the test.  Once scheduled, the [facility] Centralized Referral Team will work with your insurance carrier to obtain pre-certification or prior authorization.  Depending on your insurance carrier, approval may take 3-10 days.  It is highly recommended patients assure they have received an authorization before having a test performed.  If test is done without insurance authorization, patient may be responsible for the entire amount billed.      Precertification and Prior Authorizations:  If your physician has recommended that you have a procedure or additional testing performed the [facility]  Centralized Referral Team will contact your insurance carrier to obtain pre-certification or prior authorization.    You are strongly encouraged to contact your insurance carrier to verify that your procedure/test has been approved and is a COVERED benefit.  Although the [facility] Centralized Referral Team does its due diligence, the insurance carrier gives the disclaimer that \"Although the procedure is authorized, this does not guarantee payment.\"    Ultimately the patient is responsible for payment.   Thank you for your understanding in this matter.  Paperwork Completion:  If you require FMLA or disability paperwork for your recovery, please make sure to either drop it off or have it faxed to our office at [phone]. Be sure the form has your name and date of birth on it.  The form will be faxed to our Forms Department and they will complete it for you.  There is a 25$ fee for all forms that need to be filled out.  Please be aware there is a 10-14 day turnaround time.  You will need to sign a release of information (GOMEZ) form if your paperwork does not come with one.  You may call the Forms Department with any questions at [phone].  Their fax number is [phone].

## 2024-04-02 NOTE — PROGRESS NOTES
Neurosurgery staff    Patient seen and examined.  Please also see AVERY Bañuelos's note for further information.    The patient has a primary concern of right-sided symptoms.  These involve the leg and arm greater than the face.  She complains of numbness, weakness, and some paresthesias.    She has previously been diagnosed with small fiber neuropathy.  She has a history of IBS and fibromyalgia.  Extensive rheumatologic workup has been undertaken, and she is seronegative.    She reports some occasional left-sided hand and leg symptoms, but these are much less concerning.    She notes poor balance.  She had a history of \"drop falls\" prior to starting leflunomide.  She feels that her hands are weak.  She endorses fine motor difficulties and dropping objects.    She was diagnosed with trigeminal neuralgia, but her facial pain is across several distributions.    She also notes some history of dysphagia, including choking and aspiration.    On examination, cranial nerves are grossly intact.  Strength is 5/5 throughout.  Reflexes are 2+ and symmetric.  No Steven's or clonus.    I personally reviewed the imaging of an MRI scan of the cervical spine that was performed recently.  This demonstrates right greater than left C6-7 disc herniation, and a fairly broad disc herniation at C5-6.  These result in at least mild cord compression and moderate central canal stenosis.    Impression/plan:    Her presentation is complex.  However, I am suspicious that she has at least some component of cervical myelopathy.  Consideration is given to C5-7 ACDF.  We discussed the operation, including the attendant risks and benefits.    With her history of dysphagia, I would like to obtain a video swallow study prior to final surgical decision making.  This has been ordered.  I have also ordered cervical flexion-extension x-rays, to assess for atlantoaxial instability, given her history of possible autoimmune disorder.    She will  follow-up in 1 month or so, once the studies have been completed.

## 2024-04-05 ENCOUNTER — OFFICE VISIT (OUTPATIENT)
Dept: NEUROLOGY | Facility: CLINIC | Age: 44
End: 2024-04-05
Payer: COMMERCIAL

## 2024-04-05 VITALS
DIASTOLIC BLOOD PRESSURE: 68 MMHG | BODY MASS INDEX: 23 KG/M2 | RESPIRATION RATE: 16 BRPM | HEIGHT: 62 IN | HEART RATE: 90 BPM | WEIGHT: 125 LBS | SYSTOLIC BLOOD PRESSURE: 100 MMHG

## 2024-04-05 DIAGNOSIS — G95.9 CERVICAL MYELOPATHY (HCC): ICD-10-CM

## 2024-04-05 DIAGNOSIS — R20.2 PARESTHESIAS: ICD-10-CM

## 2024-04-05 DIAGNOSIS — G62.9 SMALL FIBER NEUROPATHY: Primary | ICD-10-CM

## 2024-04-05 DIAGNOSIS — R44.8 SENSORY IMPAIRMENT: ICD-10-CM

## 2024-04-05 PROCEDURE — 99214 OFFICE O/P EST MOD 30 MIN: CPT | Performed by: STUDENT IN AN ORGANIZED HEALTH CARE EDUCATION/TRAINING PROGRAM

## 2024-04-05 PROCEDURE — 3074F SYST BP LT 130 MM HG: CPT | Performed by: STUDENT IN AN ORGANIZED HEALTH CARE EDUCATION/TRAINING PROGRAM

## 2024-04-05 PROCEDURE — 3008F BODY MASS INDEX DOCD: CPT | Performed by: STUDENT IN AN ORGANIZED HEALTH CARE EDUCATION/TRAINING PROGRAM

## 2024-04-05 PROCEDURE — 3078F DIAST BP <80 MM HG: CPT | Performed by: STUDENT IN AN ORGANIZED HEALTH CARE EDUCATION/TRAINING PROGRAM

## 2024-04-05 NOTE — PATIENT INSTRUCTIONS
-Records from Dr. Gipson  -Go to the lab when able, swallow eval  -[facility] eval, consider [employer] [facility]onom[facility] specialists, has appt with DR Justin next year if needed. Consider gastric emptying study?  -Continue seeing neurosurgery for opinions re cervical canal narrowing  -Aim to get skin biopsy report, lip biopsy report  -May consider nerve biopsy in future pending work up      Refill policies:    Allow 2-3 business days for refills; controlled substances may take longer.  Contact your pharmacy at least 5 days prior to running out of medication and have them send an electronic request or submit request through the “request refill” option in your [employer] account.  Refills are not addressed on weekends; covering physicians do not authorize routine medications on weekends.  No narcotics or controlled substances are refilled after noon on Fridays or by on call physicians.  By law, narcotics must be electronically prescribed.  A 30 day supply with no refills is the maximum allowed.  If your prescription is due for a refill, you may be due for a follow up appointment.  To best provide you care, patients receiving routine medications need to be seen at least once a year.  Patients receiving narcotic/controlled substance medications need to be seen at least once every 3 months.  In the event that your preferred pharmacy does not have the requested medication in stock (e.g. Backordered), it is your responsibility to find another pharmacy that has the requested medication available.  We will gladly send a new prescription to that pharmacy at your request.    Scheduling Tests:    If your physician has ordered radiology tests such as MRI or CT scans, please contact Central Scheduling at [phone] right away to schedule the test.  Once scheduled, the [facility] Centralized Referral Team will work with your insurance carrier to obtain pre-certification or prior authorization.  Depending on your insurance  carrier, approval may take 3-10 days.  It is highly recommended patients assure they have received an authorization before having a test performed.  If test is done without insurance authorization, patient may be responsible for the entire amount billed.      Precertification and Prior Authorizations:  If your physician has recommended that you have a procedure or additional testing performed the [facility] Centralized Referral Team will contact your insurance carrier to obtain pre-certification or prior authorization.    You are strongly encouraged to contact your insurance carrier to verify that your procedure/test has been approved and is a COVERED benefit.  Although the [facility] Centralized Referral Team does its due diligence, the insurance carrier gives the disclaimer that \"Although the procedure is authorized, this does not guarantee payment.\"    Ultimately the patient is responsible for payment.   Thank you for your understanding in this matter.  Paperwork Completion:  If you require FMLA or disability paperwork for your recovery, please make sure to either drop it off or have it faxed to our office at [phone]. Be sure the form has your name and date of birth on it.  The form will be faxed to our Forms Department and they will complete it for you.  There is a 25$ fee for all forms that need to be filled out.  Please be aware there is a 10-14 day turnaround time.  You will need to sign a release of information (GOMEZ) form if your paperwork does not come with one.  You may call the Forms Department with any questions at [phone].  Their fax number is [phone].

## 2024-04-05 NOTE — PROGRESS NOTES
Neurology outpt follow up      Interval history April 05, 2024:   -Labs unrevealing, still some pending labs.   -MR C, T, MRA head/neck: Moderate degenerative disc bulge at c5-6 with moderate central canal stenosis and mild foraminal narrowing bilat, c6-7 moderate degenerative disc bulge with moderate central canal stenosis, moderate right and mild left neural foraminal narrowing.   -Per Dr. Jimenez 4/2/24: Her presentation is complex.  \"However, I am suspicious that she has at least some component of cervical myelopathy.  Consideration is given to C5-7 ACDF.  We discussed the operation, including the attendant risks and benefits.  Wiith her history of dysphagia, I would like to obtain a video swallow study prior to final surgical decision making.  This has been ordered.  I have also ordered cervical flexion-extension x-rays, to assess for atlantoaxial instability, given her history of possible autoimmune disorder.\"  -Saw neurosurgery at [facility] yesterday for another opinion, per patient, didn't think episodic right sided weakness driven by cord compression. Do not have records.   -Saw Dr Gipson 12/2023, gave green light to try weaning prednisone by 1 mg every few weeks, down to 9 mg (from 10), staying there, felt worse on 8 when not feeling well    Reports that doing slightly better since last visit. Right sided weakness improved (after last visit had a hard time for ~2 weeks with walking, driving, using arms). One episode aspirating on saliva. No new neurologic symptoms.     Neur review of systems:  -Cognition: more word finding troubles over months, has had a couple instances where could not bring words (could not find words, words on tip of tongue), has had a couple times where really got stuck lasting a couple minutes, no other associated symptoms that were more pronounced  -Fatigue: yes  -Pain: sharp pains right side of face, thought it was teeth (dental workup unrevealing), pain consistent with some zapping,  could trigger with palpation to certain area, lasted for a couple weeks 11/2023, self resolved, neck pain  -Depression/anxiety: no big changes, some anxiety in setting of unknown  -Vision: she reports floaters periodically when in shower confident in both eyes, getting out of shower. Floaters improved with getting out of shower and cooling off (some associated lightheadedness). Intermittently will see something in peripheral vision not moving (no confusion, bowel/bladder loss of control). Usually happens when standing. Eye doctor doesn't see any proteins floating around per patient.   -Dysphagia/dysarthria: intermittent trouble with solids/liquids similar (better on lefluonomide 20 mg, no major aspiration issues since 1/2023), couple episodes of reflux in last month  -GI: early satiety, acid reflux without clear provocation  -Motor: right arm/face/leg weakness, previously better on leflunomide, worse at last visit/better now.   -Sensory: right arm/face/leg numbness and tingling getting worse overall, rare tingling on left side of foot but not as pronounced. Right arm tremor improved on leflunomide but still slightly evident with postural hold   -Ambulation: sometimes feels like needs a cane, some close calls with walking. Last January 2023 fell in January- legs gave out (getting out of car, no premonitory signs, no passing out, gets right back up and back to normal immediately, two similar episodes in the fall), when feeling weak feels a little wobbly   -Bowel: no bleeding in awhile, feels body has adjusted to leflunomide (previously some loose stools in AM)  -Bladder: thinks she feels fully empties bladder, no recent infections, no urinary frequency  -Lhermittes: some localized shoulder pito  -Tobacco: no  -Exercise: stretches almost daily, hasn't done a whole lot more than that as seems to aggravate symptoms    Leflunomide last incresaed 1/2023. Started 10/2022 didn't work iniitally.     PAST MEDICAL HISTORY:  Past  Medical History:   Diagnosis Date    Anxiety     Connective tissue disease (HCC)     Fibromyalgia     IBS (irritable bowel syndrome)        PAST SURGICAL HISTORY:  Past Surgical History:   Procedure Laterality Date             FAMILY HISTORY:  family history includes Diabetes in her paternal grandmother; Heart Disorder in her maternal grandfather.  FMHx: Paternal first cousin struggling for 15 years with similar course, diagnosed with psoriatic arthritis, also with paternal uncle with some sort of autoimmune dysfunction. Paternal uncle had stroke in 50s in setting of psoriatic arthritis. Another paternal cousin with psoriasis    SOCIAL HISTORY:   reports that she has never smoked. She has never been exposed to tobacco smoke. She has never used smokeless tobacco. She reports that she does not drink alcohol and does not use drugs. Works in social work.     ALLERGIES:  No Known Allergies    MEDICATIONS:  Prior to Admission Medications   Medication Sig    predniSONE 5 MG Oral Tab Take 1 tablet (5 mg total) by mouth daily.    predniSONE 1 MG Oral Tab Take 4 tablets (4 mg total) by mouth daily.    ibuprofen (ADVIL) 200 MG Oral Tab Take 4 tablets (800 mg total) by mouth as needed for Pain.    leflunomide 20 MG Oral Tab Take 1 tablet (20 mg total) by mouth daily.    BuPROPion HCl ER, XL, 150 MG Oral Tablet 24 Hr 1 tablet (150 mg total)  in the morning and 1 tablet (150 mg total) before bedtime.    alprazolam 0.25 MG Oral Tab Take 1 tablet (0.25 mg total) by mouth nightly as needed for Sleep.           REVIEW OF SYSTEMS:  A 10-point system was reviewed.  Pertinent positives and negatives are noted in HPI.      PHYSICAL EXAMINATION:  VITAL SIGNS: /68 (BP Location: Right arm, Patient Position: Sitting, Cuff Size: adult)   Pulse 90   Resp 16   Ht 62\"   Wt 125 lb (56.7 kg)   LMP 2024   BMI 22.86 kg/m²   General: Alert, good recall of personal medical history    Respiratory: Non labored breathing on  room air    Cardiovascular: Regular rate    Mental status: Alert, oriented, language fluent, comprehension intact    Cranial nerves: Optic disc margins sharp VF full to confrontation bilaterally. Pupils equal, round, equally reactive to light and accommodation. Extraocular eye movements intact without nystagmus. Face sensation intact to light touch. Face strength symmetric and intact. No dysarthria.    Motor:   Power:   -Right upper extremity: shoulder abductors 5, elbow extensors 5, elbow flexors 5, wrist extensors 5, finger extension 5, finger flexion 5, finger abduction 5  -Left upper extremity: shoulder abductors 5, elbow extensors 5, elbow flexors 5, wrist extensors 5, finger extension 5, finger flexion 5, finger abduction 5  -Right lower extremity: hip flexion 5, knee extension 5, dorsiflexion 5, plantarflexion 5, great toe extension 5  -Left lower extremity: hip flexion 5, knee extension 5, dorsiflexion 5, plantarflexion 5, great toe extension 5  Tone: Normal   Bulk: Normal  Abnormal movements: None    Sensory: Intact to light touch, pinprick (diminished trace right arm and leg, no sensory level on posterior midline back), and vibration (>20s throughout except right great toe 12s) in distal extremities.    Coordination: Finger to nose and heel to shin intact.    Reflexes: Right/Left: Biceps 2/2, triceps 2/2, brachioradialis 2/2. Patella 2/2, achilles 2/2,.   Gait: Narrow based, symmetric arm swing, no gait assist, trace trouble with tandem, neg rhomberg      DIAGNOSTIC DATA:      Component      Latest Ref [facility] 3/13/2024   Hbsag Screen Index <0.10    HBSAg Screen      Nonreactive   Nonreactive    HEPATITIS B SURFACE AB QUAL      Reactive   Reactive    HEPATITIS B SURFACE AB QUANT      mIU/mL 118.53    HEPATITIS B CORE AB, TOTAL      Nonreactive   Nonreactive    MYELOPEROX ANTIBODIES, IGG      0.0 - 0.9 units <0.2    SERINE PROTEASE3, IGG      0.0 - 0.9 units <0.2    Cytoplasmic (C-ANCA)      Neg:<1:20 titer  <1:20    Perinuclear (P-ANCA)      Neg:<1:20 titer <1:20    ATYPICAL PANCA      Neg:<1:20 titer <1:20    Expanded GUNJAN Antibody Screen, IGG      <0.7 ug/l 0.10    Anti-dsDNA antibody      <10 IU/mL 3.1    Connective Tissue Disease Screen Interpretation      Negative  Negative    HEMOGLOBIN A1c      <5.7 % 5.2    ESTIMATED AVERAGE GLUCOSE      68 - 126 mg/dL 103    CALCIUM, IONIZED      4.5 - 5.6 mg/dL 4.9    CK      26 - 192 U/L 34    COMPLEMENT C4      10.0 - 40.0 mg/dL 37.1    COMPLEMENT C3      90.0 - 180.0 mg/dL 143.0    HCV AB      Non Reactive  Non Reactive    RHEUMATOID FACTOR      <15 IU/mL <10    Cryoglob Ql      None detected  Comment    HIV Antigen Antibody Combo      Non-Reactive  Non-Reactive    HCV Neg Interp Comment      IMAGING:  MRI brain wo, MRI lumbar wo (3/29/2022):  Impression: Normal MRI of brain with contrast.     Impression:     1.  Mild disc desiccation without disc herniation, central stenosis, or foraminal narrowing throughout lumbar spine.  See above discussion.     2.  Normal conus medullaris.  No focal collections are noted in lumbar spinal canal.     3.  Normal alignment.  Vertebral body height and marrow signal appear normal.     CTA head/neck 7/3/22  Impression:   1. Patency of the intracranial arterial circulation   2. Patency of the arterial vasculature of the neck.   3. No acute intracranial abnormality.   See above details/description of other findings. Please clinically correlate.     MR brain 2/2022:   FINDINGS:   No restricted diffusion to suggest acute intracranial process.     No abnormal parenchymal or leptomeningeal enhancement.     There is no acute intracranial hemorrhage, extracerebral fluid collection, or significant midline shift.     Ventricles and sulci are normal in size and configuration for the patient's age. No focal white matter lesion     The visualized paranasal sinuses and mastoid air cells are clear.     Partially visualized cervical spondylosis in the mid  cervical spine with associated mild spinal canal stenosis     IMPRESSION:     1.  No acute intracranial abnormality.     2.  No white matter lesion or abnormal enhancement     - OSH (1/25/2022): neg (JUNE, RF, CCP), normal CRP and ESR  MRI wwo R hand (2/22/23): wnl, no signs of synovitis    Per 2022 note \" Neurology [facility] who ordered MRI cervical which per patient was normal maybe slight difference is disc. EMG normal.\"     MRI SPINE THORACIC (W+WO) (CPT=72157)    Result Date: 3/17/2024  CONCLUSION:  Mild posterior degenerative disc bulging at T7-8.  There is no significant central canal stenosis or nerve root impingement.   LOCATION:  [facility]    Dictated by (CST): Luis Peralta MD on 3/17/2024 at 1:18 PM     Finalized by (CST): Luis Peralta MD on 3/17/2024 at 1:20 PM       MRI SPINE CERVICAL (W+WO) (CPT=72156)    Result Date: 3/17/2024  CONCLUSION:   1. Moderate degenerative disc bulge/osteophyte complex at C5-6 causing moderate central canal stenosis and mild bilateral neural foraminal narrowing.  2. There have disc bulge/osteophyte complex at C6-7 eccentric to the right lateral aspect of the disc space.  There is moderate central canal stenosis and moderate right and mild left neural foraminal narrowing.   LOCATION:  [facility]   Dictated by (CST): Luis Peralta MD on 3/17/2024 at 1:04 PM     Finalized by (CST): Luis Peralta MD on 3/17/2024 at 1:09 PM        FINDINGS:    MRI BRAIN  INTRACRANIAL:  There are no focal parenchymal brain abnormalities.  Diffusion weighted imaging was performed and is unremarkable.  There is no evidence for acute infarction.  There is no evidence of hemorrhage or mass lesion.  No lesions within Meckel's  cave are noted.  VENTRICLES/SULCI:   Ventricles and sulci are normal in caliber.  There are no extra-axial fluid collections.  There is no midline shift.  SINUSES/ORBITS:       The visualized paranasal sinuses are clear.  The orbits are unremarkable.  MASTOIDS:       The mastoids  are clear.     MRA BRAIN  INTRACRANIAL CAROTIDS:         No visible stenosis or aneurysm.  ANTERIOR CEREBRALS:         No visible stenosis or aneurysm.  ANTERIOR COMMUNICATING:  No visible stenosis or aneurysm.  MIDDLE CEREBRALS:         No visible stenosis or aneurysm.  POSTERIOR COMMUNICATING: No visible stenosis or aneurysm.  SUPERIOR CEREBELLARS:         No visible stenosis or aneurysm.  BASILAR:             No visible stenosis or aneurysm.  INTRACRANIAL VERTEBRALS: No visible significant stenosis or dissection.     MRA NECK  COMMON CAROTID:                 Normal for age with no visible significant stenosis or dissection.  CERVICAL INTERNAL CAROTID:  Normal for age with no visible significant stenosis or dissection.  EXTERNAL CAROTID:               Normal for age with no visible significant stenosis or dissection.  CERVICAL VERTEBRAL:               Normal for age with no visible significant stenosis or dissection.                   Impression   CONCLUSION:  No acute abnormality on MR angiography of the head and neck.         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 She reports symptoms continue to progress insidiously with right face/arm/leg paresthesias, with waxing and waning right arm/face/leg mobility impairment (slight improvement compared to last visit spontaneously), swallowing troubles intermittently. Symptoms worse with activity. She recently realized there is a family history with similar constellation of symptoms. Thinks leflunomide  may have slowed sx progression. Exam with reduced pinprick right face/arm/leg, normal reflexes. Recent sweat test with reduced sweat on right side suggestive of small fiber neuropathy affecting sudomotor fibers. Recent skin biopsy reportedly normal (per Dr. Romano note). Lip biopsy result unclear. Neuroaxis imaging MRI brain, MRA head/neck, MRI C spine, MRI T spine with degenerative changes causing moderate central canal stenosis cervical.     Clinical picture concerning for small fiber neuropathy of unclear etiology; will pursue further bloodwork to look into. Difficult to tell whether progressive sensory and waxing and waning motor symptoms secondary to small fiber neuropathy or known cervical myelopathy (waxing and waning nature less typical unless dynamic component?- advised she assess whether position changes make difference/provoke weakness) or multifactorial. She is seeing [facility] soon for eval of overall picture. Will also refer to my colleagues at the [facility] to look into possible autonomic testing and help in quest for etiology (Dr. Patterson or Cade). Pending blood-work, may consider nerve biopsy or further vascular imaging. She is to let us know if symptoms evolve; ED for new neuro symptoms/sudden onset.       Plan:  -Records from Dr. Gipson, rheum  -Go to the lab when able, swallow eval  -[facility] eval, consider [facility] autonomics specialists, has appt with DR Justin next year if needed. Consider gastric emptying study?  -Continue seeing neurosurgery for opinions re cervical canal narrowing  -Aim to get skin biopsy report  -May consider nerve biopsy in future pending work up      ICD-10-CM    1. Small fiber neuropathy  G62.9       2. Cervical myelopathy (HCC)  G95.9       3. Paresthesias  R20.2       4. Sensory impairment  R44.8         Total time 36 minutes including chart review, eliciting history, physical exam, and counseling.    Jeannette FLOWER  Block, DO

## 2024-04-12 ENCOUNTER — LAB ENCOUNTER (OUTPATIENT)
Dept: LAB | Facility: HOSPITAL | Age: 44
End: 2024-04-12
Attending: STUDENT IN AN ORGANIZED HEALTH CARE EDUCATION/TRAINING PROGRAM
Payer: COMMERCIAL

## 2024-04-12 DIAGNOSIS — R29.898 RIGHT ARM WEAKNESS: ICD-10-CM

## 2024-04-12 DIAGNOSIS — G62.9 SMALL FIBER NEUROPATHY: Primary | ICD-10-CM

## 2024-04-12 LAB
CHOLEST SERPL-MCNC: 232 MG/DL (ref ?–200)
DEPRECATED HBV CORE AB SER IA-ACNC: 7.4 NG/ML
FASTING PATIENT LIPID ANSWER: YES
HDLC SERPL-MCNC: 60 MG/DL (ref 40–59)
LDLC SERPL CALC-MCNC: 141 MG/DL (ref ?–100)
NONHDLC SERPL-MCNC: 172 MG/DL (ref ?–130)
TRIGL SERPL-MCNC: 173 MG/DL (ref 30–149)
TSI SER-ACNC: 0.79 MIU/ML (ref 0.36–3.74)
VIT B12 SERPL-MCNC: 267 PG/ML (ref 193–986)
VLDLC SERPL CALC-MCNC: 32 MG/DL (ref 0–30)

## 2024-04-12 PROCEDURE — 82728 ASSAY OF FERRITIN: CPT

## 2024-04-12 PROCEDURE — 86618 LYME DISEASE ANTIBODY: CPT

## 2024-04-12 PROCEDURE — 86255 FLUORESCENT ANTIBODY SCREEN: CPT

## 2024-04-12 PROCEDURE — 82607 VITAMIN B-12: CPT

## 2024-04-12 PROCEDURE — 83015 HEAVY METAL QUAL ANY NUMBER: CPT

## 2024-04-12 PROCEDURE — 83655 ASSAY OF LEAD: CPT

## 2024-04-12 PROCEDURE — 84446 ASSAY OF VITAMIN E: CPT

## 2024-04-12 PROCEDURE — 80061 LIPID PANEL: CPT

## 2024-04-12 PROCEDURE — 83521 IG LIGHT CHAINS FREE EACH: CPT

## 2024-04-12 PROCEDURE — 86341 ISLET CELL ANTIBODY: CPT

## 2024-04-12 PROCEDURE — 86334 IMMUNOFIX E-PHORESIS SERUM: CPT

## 2024-04-12 PROCEDURE — 83825 ASSAY OF MERCURY: CPT

## 2024-04-12 PROCEDURE — 84207 ASSAY OF VITAMIN B-6: CPT

## 2024-04-12 PROCEDURE — 86235 NUCLEAR ANTIGEN ANTIBODY: CPT

## 2024-04-12 PROCEDURE — 84165 PROTEIN E-PHORESIS SERUM: CPT

## 2024-04-12 PROCEDURE — 84443 ASSAY THYROID STIM HORMONE: CPT

## 2024-04-12 PROCEDURE — 84425 ASSAY OF VITAMIN B-1: CPT

## 2024-04-12 PROCEDURE — 86258 DGP ANTIBODY EACH IG CLASS: CPT

## 2024-04-12 PROCEDURE — 36415 COLL VENOUS BLD VENIPUNCTURE: CPT

## 2024-04-12 PROCEDURE — 82300 ASSAY OF CADMIUM: CPT

## 2024-04-12 PROCEDURE — 82175 ASSAY OF ARSENIC: CPT

## 2024-04-15 LAB
B BURGDOR IGG+IGM SER QL: NEGATIVE
CREATININE UR: 0.41 G/L
VITAMIN B1 WHOLE BLD: 130.2 NMOL/L

## 2024-04-16 LAB
ARSENIC BLOOD: 2 UG/L
CADMIUM, BLOOD: <0.5 UG/L
ENA SS-A IGG SER IA-ACNC: <0.4 U/ML
ENA SS-B IGG SER IA-ACNC: <0.4 U/ML
GLIADIN IGA SER-ACNC: 0.5 U/ML (ref ?–7)
LEAD BLOOD: <1 UG/DL
MERCURY BLOOD: <1 UG/L
VIT E ALPHA TOCO: 13.3 MG/L
VIT E GAMMA TOCO: 1.7 MG/L

## 2024-04-17 DIAGNOSIS — R77.8 ABNORMAL SPEP: Primary | ICD-10-CM

## 2024-04-17 LAB
ALBUMIN SERPL ELPH-MCNC: 4.35 G/DL (ref 3.75–5.21)
ALBUMIN/GLOB SERPL: 1.85 {RATIO} (ref 1–2)
ALPHA1 GLOB SERPL ELPH-MCNC: 0.27 G/DL (ref 0.19–0.46)
ALPHA2 GLOB SERPL ELPH-MCNC: 0.67 G/DL (ref 0.48–1.05)
B-GLOBULIN SERPL ELPH-MCNC: 0.82 G/DL (ref 0.68–1.23)
GAMMA GLOB SERPL ELPH-MCNC: 0.58 G/DL (ref 0.62–1.7)
KAPPA LC FREE SER-MCNC: 1.09 MG/DL (ref 0.33–1.94)
KAPPA LC FREE/LAMBDA FREE SER NEPH: 1.22 {RATIO} (ref 0.26–1.65)
LAMBDA LC FREE SERPL-MCNC: 0.89 MG/DL (ref 0.57–2.63)
PROT SERPL-MCNC: 6.7 G/DL (ref 5.7–8.2)
VITAMIN B6: 4.5 UG/L

## 2024-05-06 ENCOUNTER — OFFICE VISIT (OUTPATIENT)
Dept: FAMILY MEDICINE CLINIC | Facility: CLINIC | Age: 44
End: 2024-05-06
Payer: COMMERCIAL

## 2024-05-06 VITALS
SYSTOLIC BLOOD PRESSURE: 120 MMHG | WEIGHT: 140.63 LBS | HEART RATE: 66 BPM | BODY MASS INDEX: 25.88 KG/M2 | RESPIRATION RATE: 16 BRPM | TEMPERATURE: 97 F | HEIGHT: 62 IN | DIASTOLIC BLOOD PRESSURE: 68 MMHG

## 2024-05-06 DIAGNOSIS — J45.990 MILD EXERCISE-INDUCED ASTHMA (HCC): ICD-10-CM

## 2024-05-06 DIAGNOSIS — Z00.00 WELLNESS EXAMINATION: Primary | ICD-10-CM

## 2024-05-06 RX ORDER — MELATONIN
1000 DAILY
COMMUNITY
Start: 2022-03-29

## 2024-05-06 RX ORDER — ALBUTEROL SULFATE 90 UG/1
2 AEROSOL, METERED RESPIRATORY (INHALATION)
Qty: 18 G | Refills: 1 | Status: SHIPPED | OUTPATIENT
Start: 2024-05-06

## 2024-05-06 NOTE — PROGRESS NOTES
[facility]  24    Chief Complaint   Patient presents with    Physical     HPI:   Morelia Camarillo is a 43 year old female who presents for a complete physical exam.     Patient has had lifelong autoimmune symptoms. Had dx of fibromyalgia, IBS. Mostly was symptom management.     Three years ago had medical event. Had sunburn had an immune response, had swelling from the waist down and couldn't walk well due to the pain. Recovered. Then had flares intermittently. Saw Rheumatology 2022. Was diagnosed with autoimmune arthritis but had negative testing. Then a week later had symptoms of a stroke but was ruled out from a stroke. Had weakness, tingling, numbness, pain on the right side of the body. This will flare intermittently. Was thought to have a relapse this past 2024.     Had MRI spine which could be recommended for surgery but does not explain symptoms.    Otherwise healthy.  Currently on prednisone and leflunomide.    Will be seeing [facility] for neuromuscular, [facility], neurologist and spine.    To see eye doctor on Saturday.     Hx mild asthma - exercise induced asthma.       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  FHx: paternal cousin has similar symptoms since age 9; paternal uncle with autoimmune symptoms  Exercise: walking, stretching  Diet: healthy overall, sometimes more convenient  food lately  Periods: Patient's last menstrual period was 2024 (exact date).  Immunizations: avoiding vaccines since , did not get covid vaccine  Screenings: follows with gynecology for pap smear, due for mammogram      Wt Readings from Last 6 Encounters:   24 140 lb 9.6 oz (63.8 kg)   24 125 lb (56.7 kg)   24 125 lb  (56.7 kg)   02/26/24 125 lb (56.7 kg)   02/21/24 135 lb 3.2 oz (61.3 kg)   07/31/23 125 lb (56.7 kg)     Body mass index is 25.72 kg/m².     Results for orders placed or performed in visit on 04/12/24   Vitamin B12   Result Value Ref Range    Vitamin B12 267 193 - 986 pg/mL   Vitamin B6   Result Value Ref Range    Vitamin B6 4.5 3.4 - 65.2 ug/L   Vitamin B1 (Thiamine), Blood   Result Value Ref Range    Vitamin B1 Whole Bld 130.2 66.5 - 200.0 nmol/L   Vitamin E, Serum   Result Value Ref Range    Vit E Alpha Vina 13.3 7.0 - 25.1 mg/L    Vit E Gamma Vina 1.7 0.5 - 5.5 mg/L   Lipid Panel   Result Value Ref Range    Cholesterol, Total 232 (H) <200 mg/dL    HDL Cholesterol 60 (H) 40 - 59 mg/dL    Triglycerides 173 (H) 30 - 149 mg/dL    LDL Cholesterol 141 (H) <100 mg/dL    VLDL 32 (H) 0 - 30 mg/dL    Non HDL Chol 172 (H) <130 mg/dL    Patient Fasting for Lipid? Yes    TSH W Reflex To Free T4   Result Value Ref Range    TSH 0.785 0.358 - 3.740 mIU/mL   Ferritin   Result Value Ref Range    Ferritin 7.4 (L) 12.0 - 240.0 ng/mL   Sjogren'S Anti-SS-A   Result Value Ref Range    Anti-SSA Antibody, IGG <0.4 <7 U/mL   Sjogren's Anti SSB   Result Value Ref Range    Anti-SSB Antibody, IGG <0.4 <7 U/mL   Lyme Disease, Total Ab W Rflx   Result Value Ref Range    Lyme Screen IgG and IgM Negative Negative   Heavy Metals Profile II, Urine   Result Value Ref Range    Cadmium Ur None Detected None detected ug/L    Mercury Ur None Detected 0 - 19 ug/L    Arsenic Total Ur None Detected 0 - 9 ug/L    Lead Ur None Detected 0 - 49 ug/L    Creatinine Urine 0.41 0.30 - 3.00 g/L   Heavy Metals Profile II, Blood   Result Value Ref Range    Lead Blood <1.0 0.0 - 3.4 ug/dL    Arsenic Blood 2 0 - 9 ug/L    Mercury Blood <1.0 0.0 - 14.9 ug/L    Cadmium, Blood <0.5 0.0 - 1.2 ug/L   Deamidated Gliadin Abs, IgA   Result Value Ref Range    Gliadin Deamidated IgA Ab 0.5 <7.0 U/mL   Miscellaneous - [place]   Result Value Ref Range    Misc Test SEE COMMENTS     SERUM MONOCLONAL PROTEIN STUDY   Result Value Ref Range    Total Protein 6.7 5.7 - 8.2 g/dL    Albumin 4.35 3.75 - 5.21 g/dL    Alpha-1-Globulins 0.27 0.19 - 0.46 g/dL    Alpha-2-Globulins 0.67 0.48 - 1.05 g/dL    Beta Globulins 0.82 0.68 - 1.23 g/dL    Gamma Globulins 0.58 (L) 0.62 - 1.70 g/dL    Albumin/Globulin Ratio 1.85 1.00 - 2.00    SPE Interpretation       No apparent monoclonal protein on serum electrophoresis.      Immunofixation       No monoclonal protein detected by immunofixation.    Reviewed by Manuela Tong M.D. Pathology 04/17/24 at 2:12 PM          Kappa Free Light Chain 1.093 0.330 - 1.940 mg/dL    Lambda Free Light Chain 0.893 0.571 - 2.630 mg/dL    Kappa/Lambda Flc Ratio 1.22 0.26 - 1.65       Current Outpatient Medications   Medication Sig Dispense Refill    cyanocobalamin 1000 MCG Oral Tab Take 1 tablet (1,000 mcg total) by mouth daily.      albuterol 108 (90 Base) MCG/ACT Inhalation Aero Soln Inhale 2 puffs into the lungs every 4 to 6 hours. 18 g 1    predniSONE 5 MG Oral Tab Take 1 tablet (5 mg total) by mouth daily.      predniSONE 1 MG Oral Tab Take 4 tablets (4 mg total) by mouth daily.      ibuprofen (ADVIL) 200 MG Oral Tab Take 4 tablets (800 mg total) by mouth as needed for Pain.      leflunomide 20 MG Oral Tab Take 1 tablet (20 mg total) by mouth daily.      BuPROPion HCl ER, XL, 150 MG Oral Tablet 24 Hr 1 tablet (150 mg total)  in the morning and 1 tablet (150 mg total) before bedtime.  0    alprazolam 0.25 MG Oral Tab Take 1 tablet (0.25 mg total) by mouth nightly as needed for Sleep.        No Known Allergies   Past Medical History:    Anxiety    Arthritis    Unspecified autoimmune arthritis    Asthma (HCC)    Mild    Connective tissue disease (HCC)    Esophageal reflux    Improved since beginning Leflunomide    Fibromyalgia    IBS (irritable bowel syndrome)    Osteoarthritis    Recent finding of cervical spinal cord compression      Past Surgical History:    Procedure Laterality Date            Family History   Problem Relation Age of Onset    Depression Mother     Hyperlipidemia Mother     Gastrointestinal Cancer Father         esophageal    Heart Disorder Maternal Grandfather     Diabetes Paternal Grandmother     Dementia Paternal Grandmother       Social History:   Social History     Socioeconomic History    Marital status:    Tobacco Use    Smoking status: Never     Passive exposure: Never    Smokeless tobacco: Never   Vaping Use    Vaping status: Never Used   Substance and Sexual Activity    Alcohol use: Not Currently    Drug use: Never   Other Topics Concern    Caffeine Concern No    Stress Concern Yes     Comment: Unknown medical concern is stressful    Weight Concern Yes     Comment: Weight gain with steroid use    Special Diet No    Exercise Yes     Comment: Some days exercise exacerbates symptoms    Seat Belt No     Social Determinants of Health     Food Insecurity: Low Risk  (3/28/2022)    Received from [facility]    Food Insecurity     Have there been times that your food ran out, and you didn't have money to get more?: No     Are there times that you worry that this might happen?: No   Transportation Needs: Low Risk  (3/28/2022)    Received from [facility]    Transportation Needs     Do you have trouble getting transportation to medical appointments?: No   Housing Stability: Low Risk  (3/28/2022)    Received from [facility]    Housing Stability     Are you concerned about having a safe and reliable place to live?: No        REVIEW OF SYSTEMS:   GENERAL: feels well otherwise  SKIN: denies any unusual skin lesions  EYES: +floaters  HEENT: denies nasal congestion, sinus pain or ST  LUNGS: denies shortness of breath with exertion, denies cough  CARDIOVASCULAR: denies chest pain on exertion or at rest, denies palpitations  GI: +IBS, no blood in the stool  : denies dysuria,  incontinence, vaginal discharge or itching,periods regular   MUSCULOSKELETAL: +joint aches  NEURO: +nummular headaches, +dizziness, denies syncope  PSYCHE: +anxiety following with psychiatry   HEMATOLOGIC: + hx of anemia  ENDOCRINE: denies thyroid history  ALL/ASTHMA: denies hx of allergy, +hx mild asthma    EXAM:   /68 (BP Location: Right arm, Patient Position: Sitting, Cuff Size: adult)   Pulse 66   Temp 97 °F (36.1 °C) (Temporal)   Resp 16   Ht 5' 2\" (1.575 m)   Wt 140 lb 9.6 oz (63.8 kg)   LMP 04/18/2024 (Exact Date)   BMI 25.72 kg/m²   Body mass index is 25.72 kg/m².   GENERAL: well developed, well nourished,in no apparent distress  SKIN: no rash  HEENT: atraumatic, normocephalic,ears and throat are clear  EYES:PERRLA, EOMI,conjunctiva are clear  NECK: supple,no adenopathy,no thyromegaly  BREAST: deferred to gynecology  LUNGS: clear to auscultation; no rhonchi, rales, or wheezing  CARDIO: RRR without murmur  GI: good BS's, no masses, HSM or tenderness  : deferred to gynecology  MUSCULOSKELETAL: FROM of the back  EXTREMITIES: no cyanosis, clubbing or edema  NEURO: Oriented times three,cranial nerves are intact,motor and sensory are grossly intact; 2+ knee reflexes bilaterally  VASCULAR: 2+ posterior tibial pulses bilaterally    ASSESSMENT AND PLAN:   Morelia Camarillo is a 43 year old female who presents for a complete physical exam.     - Pap and pelvic deferred to gynecology. Last pap: 09/22/2023.   - Order for mammogram per gynecology. Last mammogram: 09/22/2023.  - BP: at goal  - Pt' s weight is Body mass index is 25.72 kg/m².,  recommended low fat diet and aerobic exercise 30 minutes three times weekly.      Mild exercise induced asthma - will refill albuterol as needed.    Reviewed recent labs. No further labs needed.    Hold on vaccines until further evaluation with specialists - and patient is on prednisone so may have incomplete response at this time.     The patient indicates understanding  of these issues and agrees to the plan.      Health maintenance, will check: No orders of the defined types were placed in this encounter.          Encounter Diagnoses   Name Primary?    Wellness examination Yes    Mild exercise-induced asthma (HCC)        Meds & Refills for this Visit:  Requested Prescriptions     Signed Prescriptions Disp Refills    albuterol 108 (90 Base) MCG/ACT Inhalation Aero Soln 18 g 1     Sig: Inhale 2 puffs into the lungs every 4 to 6 hours.       Imaging & Consults:  None      Return in about 1 year (around 5/6/2025) for wellness exam, or sooner if needed.      Felecia Sanchez MD  [facility]  05/06/24

## 2024-05-15 ENCOUNTER — TELEPHONE (OUTPATIENT)
Dept: FAMILY MEDICINE CLINIC | Facility: CLINIC | Age: 44
End: 2024-05-15

## 2024-05-15 DIAGNOSIS — Z12.31 ENCOUNTER FOR SCREENING MAMMOGRAM FOR MALIGNANT NEOPLASM OF BREAST: Primary | ICD-10-CM

## 2024-05-15 NOTE — TELEPHONE ENCOUNTER
Called Deanne,[employer] Sched[employer]. No answer, left a detailed message to identifiable voicemail that Mammogram was ordered.    Attempted to notify patient. No answer, left voice message to call back.

## 2024-05-15 NOTE — TELEPHONE ENCOUNTER
Deanne form [employer] scheduling called asking for a screening Mammogram order per patient's request.    Patient's last office visit 5/6/2024 Physical    Okay to order screening mammogram? Please see pended order and sign if appropriate. Thank you.    Deanne needs a callback once order placed so she can call patient and schedule test.

## 2024-05-21 ENCOUNTER — HOSPITAL ENCOUNTER (OUTPATIENT)
Dept: MAMMOGRAPHY | Facility: HOSPITAL | Age: 44
Discharge: HOME | End: 2024-05-21
Attending: STUDENT IN AN ORGANIZED HEALTH CARE EDUCATION/TRAINING PROGRAM

## 2024-05-21 DIAGNOSIS — Z12.31 ENCOUNTER FOR SCREENING MAMMOGRAM FOR MALIGNANT NEOPLASM OF BREAST: ICD-10-CM

## 2024-05-21 PROCEDURE — 77067 SCR MAMMO BI INCL CAD: CPT | Performed by: STUDENT IN AN ORGANIZED HEALTH CARE EDUCATION/TRAINING PROGRAM

## 2024-05-21 PROCEDURE — 77063 BREAST TOMOSYNTHESIS BI: CPT | Performed by: STUDENT IN AN ORGANIZED HEALTH CARE EDUCATION/TRAINING PROGRAM

## 2024-06-07 ENCOUNTER — OFFICE VISIT (OUTPATIENT)
Dept: NEUROLOGY | Facility: CLINIC | Age: 44
End: 2024-06-07
Payer: COMMERCIAL

## 2024-06-07 VITALS
WEIGHT: 141 LBS | BODY MASS INDEX: 26 KG/M2 | RESPIRATION RATE: 16 BRPM | HEART RATE: 86 BPM | SYSTOLIC BLOOD PRESSURE: 120 MMHG | DIASTOLIC BLOOD PRESSURE: 68 MMHG

## 2024-06-07 DIAGNOSIS — R79.0 LOW FERRITIN: ICD-10-CM

## 2024-06-07 DIAGNOSIS — R77.1 ABNORMALITY OF GLOBULIN: ICD-10-CM

## 2024-06-07 DIAGNOSIS — G62.9 SMALL FIBER NEUROPATHY: Primary | ICD-10-CM

## 2024-06-07 DIAGNOSIS — M47.812 SPONDYLOSIS OF CERVICAL REGION WITHOUT MYELOPATHY OR RADICULOPATHY: ICD-10-CM

## 2024-06-07 PROCEDURE — 3078F DIAST BP <80 MM HG: CPT | Performed by: STUDENT IN AN ORGANIZED HEALTH CARE EDUCATION/TRAINING PROGRAM

## 2024-06-07 PROCEDURE — 3074F SYST BP LT 130 MM HG: CPT | Performed by: STUDENT IN AN ORGANIZED HEALTH CARE EDUCATION/TRAINING PROGRAM

## 2024-06-07 PROCEDURE — 99215 OFFICE O/P EST HI 40 MIN: CPT | Performed by: STUDENT IN AN ORGANIZED HEALTH CARE EDUCATION/TRAINING PROGRAM

## 2024-06-07 NOTE — PROGRESS NOTES
Neurology outpt follow up  Interval history June 07, 2024:   -[facility]:   -Spine doctor: Advised symptomatic management PT and HEP, discussed cervical epidural, advised serial following.    -Neuromuscular: Dr Simmons (don't see notes yet). Per Dr Washington- small fiber neuropathy, may be an autoimmune component, etiology unclear (50% small fiber neuropathy idiopathic)   -Neurology and internal medicine later this month  -Swallow study next week  -Per review rheum note 12/2023, Dr Gipson, undifferentiated CTD with neurolgoic issue. Remained on leflunomide 20, advised trialing prendisone wean, did not tolerate from 9 to 8, went back to 9.     Thinks she's recovered from acute relapse. Doesn't feel well today, symptosm on right side worse. Remains on prednisone 9 and leflunomide (had relapse with prednisone 8).     Component      Latest Ref Rng 4/12/2024   PROTEIN, TOTAL      5.7 - 8.2 g/dL 6.7    Albumin      3.75 - 5.21 g/dL 4.35    ALPHA-1-GLOBULINS      0.19 - 0.46 g/dL 0.27    ALPHA-2-GLOBULINS      0.48 - 1.05 g/dL 0.67    BETA GLOBULINS      0.68 - 1.23 g/dL 0.82    GAMMA GLOBULINS      0.62 - 1.70 g/dL 0.58 (L)    ALBUMIN/GLOBULIN RATIO      1.00 - 2.00  1.85    SPE INTERPRETATION No apparent monoclonal protein on serum electrophoresis.    IMMUNOFIXATION No monoclonal protein detected by immunofixation.…    KAPPA FREE LIGHT CHAIN      0.330 - 1.940 mg/dL 1.093    LAMBDA FREE LIGHT CHAIN      0.571 - 2.630 mg/dL 0.893    KAPPA/LAMBDA FLC RATIO      0.26 - 1.65  1.22    Cholesterol, Total      <200 mg/dL 232 (H)    HDL Cholesterol      40 - 59 mg/dL 60 (H)    Triglycerides      30 - 149 mg/dL 173 (H)    LDL Cholesterol Calc      <100 mg/dL 141 (H)    VLDL      0 - 30 mg/dL 32 (H)    NON-HDL CHOLESTEROL      <130 mg/dL 172 (H)    Patient Fasting for Lipid? Yes    Cadmium Ur      None detected ug/L None Detected    Mercury Ur      0 - 19 ug/L None Detected    Arsenic Total Ur      0 - 9 ug/L None Detected     Lead Ur      0 - 49 ug/L None Detected    Creatinine, Urine      0.30 - 3.00 g/L 0.41    Lead Blood      0.0 - 3.4 ug/dL <1.0    ARSENIC BLOOD      0 - 9 ug/L 2    Mercury Blood      0.0 - 14.9 ug/L <1.0    Cadmium, Blood      0.0 - 1.2 ug/L <0.5    Vit E Alpha [place]      7.0 - 25.1 mg/L 13.3    Vit E Gamma [place]      0.5 - 5.5 mg/L 1.7    Vitamin B12      193 - 986 pg/mL 267    VITAMIN B6      3.4 - 65.2 ug/L 4.5    VITAMIN B1 (THIAMINE), WHOLE B      66.5 - 200.0 nmol/L 130.2    TSH      0.358 - 3.740 mIU/mL 0.785    FERRITIN      12.0 - 240.0 ng/mL 7.4 (L)    Anti-SSA Antibody, IGG      <7 U/mL <0.4    Anti-SSB Antibody, IGG      <7 U/mL <0.4    Lyme Screen IgG and IgM      Negative  Negative    Gliadin Deamidated IgA Ab      <7.0 U/mL 0.5    MISC TEST SEE COMMENTS       Legend:  (L) Low  (H) High    Interval history April 05, 2024:   -Labs unrevealing, still some pending labs.   -MR C, T, MRA head/neck: Moderate degenerative disc bulge at c5-6 with moderate central canal stenosis and mild foraminal narrowing bilat, c6-7 moderate degenerative disc bulge with moderate central canal stenosis, moderate right and mild left neural foraminal narrowing.   -Per Dr. Jimenez 4/2/24: Her presentation is complex.  \"However, I am suspicious that she has at least some component of cervical myelopathy.  Consideration is given to C5-7 ACDF.  We discussed the operation, including the attendant risks and benefits.  Wiith her history of dysphagia, I would like to obtain a video swallow study prior to final surgical decision making.  This has been ordered.  I have also ordered cervical flexion-extension x-rays, to assess for atlantoaxial instability, given her history of possible autoimmune disorder.\"  -Saw neurosurgery at [facility] yesterday for another opinion, per patient, didn't think episodic right sided weakness driven by cord compression. Do not have records.   -Saw Dr Gipson 12/2023, gave green light to try weaning prednisone by 1  mg every few weeks, down to 9 mg (from 10), staying there, felt worse on 8 when not feeling well    Reports that doing slightly better since last visit. Right sided weakness improved (after last visit had a hard time for ~2 weeks with walking, driving, using arms). One episode aspirating on saliva. No new neurologic symptoms.     Neur review of systems:  -Cognition: more word finding troubles over months, has had a couple instances where could not bring words (could not find words, words on tip of tongue), has had a couple times where really got stuck lasting a couple minutes, no other associated symptoms that were more pronounced  -Fatigue: yes  -Pain: sharp pains right side of face, thought it was teeth (dental workup unrevealing), pain consistent with some zapping, could trigger with palpation to certain area, lasted for a couple weeks 11/2023, self resolved, neck pain  -Depression/anxiety: no big changes, some anxiety in setting of unknown  -Vision: she reports floaters periodically when in shower confident in both eyes, getting out of shower. Floaters improved with getting out of shower and cooling off (some associated lightheadedness). Intermittently will see something in peripheral vision not moving (no confusion, bowel/bladder loss of control). Usually happens when standing. Eye doctor doesn't see any proteins floating around per patient.   -Dysphagia/dysarthria: intermittent trouble with solids/liquids similar (better on lefluonomide 20 mg, no major aspiration issues since 1/2023), couple episodes of reflux in last month  -GI: early satiety, acid reflux without clear provocation  -Motor: right arm/face/leg weakness, previously better on leflunomide, worse at last visit/better now.   -Sensory: right arm/face/leg numbness and tingling getting worse overall, rare tingling on left side of foot but not as pronounced. Right arm tremor improved on leflunomide but still slightly evident with postural hold    -Ambulation: sometimes feels like needs a cane, some close calls with walking. Last 2023 fell in January- legs gave out (getting out of car, no premonitory signs, no passing out, gets right back up and back to normal immediately, two similar episodes in the fall), when feeling weak feels a little wobbly   -Bowel: no bleeding in awhile, feels body has adjusted to leflunomide (previously some loose stools in AM)  -Bladder: thinks she feels fully empties bladder, no recent infections, no urinary frequency  -Lhermittes: some localized shoulder pito  -Tobacco: no  -Exercise: stretches almost daily, hasn't done a whole lot more than that as seems to aggravate symptoms    Leflunomide last incresaed 2023. [place]ed [place]2022 [place].     PAST MEDICAL HISTORY:  Past Medical History:    Anxiety    Arthritis    Unspecified autoimmune arthritis    Asthma (HCC)    Mild    Connective tissue disease (HCC)    Esophageal reflux    Improved since beginning Leflunomide    Fibromyalgia    IBS (irritable bowel syndrome)    Osteoarthritis    Recent finding of cervical spinal cord compression       PAST SURGICAL HISTORY:  Past Surgical History:   Procedure Laterality Date             FAMILY HISTORY:  family history includes Dementia in her paternal grandmother; Depression in her mother; Diabetes in her paternal grandmother; Gastrointestinal Cancer in her father; Heart Disorder in her maternal grandfather; Hyperlipidemia in her mother; Ovarian Cancer in her paternal aunt.  FMHx: Paternal first cousin struggling for 15 years with similar course, diagnosed with psoriatic arthritis, also with paternal uncle with some sort of autoimmune dysfunction. Paternal uncle had stroke in 50s in setting of psoriatic arthritis. Another paternal cousin with psoriasis    SOCIAL HISTORY:   reports that she has never smoked. She has never been exposed to tobacco smoke. She has never used smokeless tobacco. She reports that she  does not currently use alcohol. She reports that she does not use drugs. Works in social work.     ALLERGIES:  No Known Allergies    MEDICATIONS:  Prior to Admission Medications   Medication Sig    cyanocobalamin 1000 MCG Oral Tab Take 1 tablet (1,000 mcg total) by mouth daily.    albuterol 108 (90 Base) MCG/ACT Inhalation Aero Soln Inhale 2 puffs into the lungs every 4 to 6 hours.    predniSONE 5 MG Oral Tab Take 1 tablet (5 mg total) by mouth daily.    predniSONE 1 MG Oral Tab Take 4 tablets (4 mg total) by mouth daily.    ibuprofen (ADVIL) 200 MG Oral Tab Take 4 tablets (800 mg total) by mouth as needed for Pain.    leflunomide 20 MG Oral Tab Take 1 tablet (20 mg total) by mouth daily.    BuPROPion HCl ER, XL, 150 MG Oral Tablet 24 Hr 1 tablet (150 mg total)  in the morning and 1 tablet (150 mg total) before bedtime.    alprazolam 0.25 MG Oral Tab Take 1 tablet (0.25 mg total) by mouth nightly as needed for Sleep.     No current facility-administered medications for this visit.         REVIEW OF SYSTEMS:  A 10-point system was reviewed.  Pertinent positives and negatives are noted in HPI.      PHYSICAL EXAMINATION:  VITAL SIGNS: /68   Pulse 86   Resp 16   Wt 141 lb (64 kg)   LMP 05/17/2024   BMI 25.79 kg/m²   General: Alert, good recall of personal medical history    Respiratory: Non labored breathing on room air    Cardiovascular: Regular rate    Mental status: Alert, oriented, language fluent, comprehension intact    Cranial nerves: Optic disc margins sharp VF full to confrontation bilaterally. Pupils equal, round, equally reactive to light and accommodation. Extraocular eye movements intact without nystagmus. Face sensation intact to light touch. Face strength symmetric and intact. No dysarthria.    Motor:   Power:   -Right upper extremity: shoulder abductors 5, elbow extensors 5, elbow flexors 5, wrist extensors 5, finger extension 5, finger flexion 5, finger abduction 5  -Left upper extremity:  shoulder abductors 5, elbow extensors 5, elbow flexors 5, wrist extensors 5, finger extension 5, finger flexion 5, finger abduction 5  -Right lower extremity: hip flexion 5, knee extension 5, dorsiflexion 5, plantarflexion 5, great toe extension 5  -Left lower extremity: hip flexion 5, knee extension 5, dorsiflexion 5, plantarflexion 5, great toe extension 5  Tone: Normal   Bulk: Normal  Abnormal movements: None    Sensory: Intact to light touch, pinprick (diminished trace right arm and face), and finger proprioception intact  in distal extremities.    Coordination: Finger to nose and heel to shin intact.    Reflexes: Right/Left: Biceps 2/2, triceps 2/2, brachioradialis 2/2. Patella 2/2, achilles 2/2,.   Gait: Narrow based, symmetric arm swing, no gait assist, trace trouble with tandem, neg rhomberg      DIAGNOSTIC DATA:      Component      Latest Ref Rng 3/13/2024   Hbsag Screen Index <0.10    HBSAg Screen      Nonreactive   Nonreactive    HEPATITIS B SURFACE AB QUAL      Reactive   Reactive    HEPATITIS B SURFACE AB QUANT      mIU/mL 118.53    HEPATITIS B CORE AB, TOTAL      Nonreactive   Nonreactive    MYELOPEROX ANTIBODIES, IGG      0.0 - 0.9 units <0.2    SERINE PROTEASE3, IGG      0.0 - 0.9 units <0.2    Cytoplasmic (C-ANCA)      Neg:<1:20 titer <1:20    Perinuclear (P-ANCA)      Neg:<1:20 titer <1:20    ATYPICAL PANCA      Neg:<1:20 titer <1:20    Expanded GUNJAN Antibody Screen, IGG      <0.7 ug/l 0.10    Anti-dsDNA antibody      <10 IU/mL 3.1    Connective Tissue Disease Screen Interpretation      Negative  Negative    HEMOGLOBIN A1c      <5.7 % 5.2    ESTIMATED AVERAGE GLUCOSE      68 - 126 mg/dL 103    CALCIUM, IONIZED      4.5 - 5.6 mg/dL 4.9    CK      26 - 192 U/L 34    COMPLEMENT C4      10.0 - 40.0 mg/dL 37.1    COMPLEMENT C3      90.0 - 180.0 mg/dL 143.0    HCV AB      Non Reactive  Non Reactive    RHEUMATOID FACTOR      <15 IU/mL <10    Cryoglob Ql      None detected  Comment    HIV Antigen Antibody  Combo      Non-Reactive  Non-Reactive    HCV Neg Interp Comment      IMAGING:  MRI brain wo, MRI lumbar wo (3/29/2022):  Impression: Normal MRI of brain with contrast.     Impression:     1.  Mild disc desiccation without disc herniation, central stenosis, or foraminal narrowing throughout lumbar spine.  See above discussion.     2.  Normal conus medullaris.  No focal collections are noted in lumbar spinal canal.     3.  Normal alignment.  Vertebral body height and marrow signal appear normal.     CTA head/neck 7/3/22  Impression:   1. Patency of the intracranial arterial circulation   2. Patency of the arterial vasculature of the neck.   3. No acute intracranial abnormality.   See above details/description of other findings. Please clinically correlate.     MR brain 2/2022:   FINDINGS:   No restricted diffusion to suggest acute intracranial process.     No abnormal parenchymal or leptomeningeal enhancement.     There is no acute intracranial hemorrhage, extracerebral fluid collection, or significant midline shift.     Ventricles and sulci are normal in size and configuration for the patient's age. No focal white matter lesion     The visualized paranasal sinuses and mastoid air cells are clear.     Partially visualized cervical spondylosis in the mid cervical spine with associated mild spinal canal stenosis     IMPRESSION:     1.  No acute intracranial abnormality.     2.  No white matter lesion or abnormal enhancement     - OSH (1/25/2022): neg (JUNE, RF, CCP), normal CRP and ESR  MRI wwo R hand (2/22/23): wnl, no signs of synovitis    Per 2022 note \" Neurology [facility] who ordered MRI cervical which per patient was normal maybe slight difference is disc. EMG normal.\"     MRI SPINE THORACIC (W+WO) (CPT=72157)    Result Date: 3/17/2024  CONCLUSION:  Mild posterior degenerative disc bulging at T7-8.  There is no significant central canal stenosis or nerve root impingement.   LOCATION:  [facility]    Dictated by  (CST): Luis Peralta MD on 3/17/2024 at 1:18 PM     Finalized by (CST): Luis Peralta MD on 3/17/2024 at 1:20 PM       MRI SPINE CERVICAL (W+WO) (CPT=72156)    Result Date: 3/17/2024  CONCLUSION:   1. Moderate degenerative disc bulge/osteophyte complex at C5-6 causing moderate central canal stenosis and mild bilateral neural foraminal narrowing.  2. There have disc bulge/osteophyte complex at C6-7 eccentric to the right lateral aspect of the disc space.  There is moderate central canal stenosis and moderate right and mild left neural foraminal narrowing.   LOCATION:  [facility]   Dictated by (CST): Luis Peralta MD on 3/17/2024 at 1:04 PM     Finalized by (CST): Luis Peralta MD on 3/17/2024 at 1:09 PM        FINDINGS:    MRI BRAIN  INTRACRANIAL:  There are no focal parenchymal brain abnormalities.  Diffusion weighted imaging was performed and is unremarkable.  There is no evidence for acute infarction.  There is no evidence of hemorrhage or mass lesion.  No lesions within Meckel's  cave are noted.  VENTRICLES/SULCI:   Ventricles and sulci are normal in caliber.  There are no extra-axial fluid collections.  There is no midline shift.  SINUSES/ORBITS:       The visualized paranasal sinuses are clear.  The orbits are unremarkable.  MASTOIDS:       The mastoids are clear.     MRA BRAIN  INTRACRANIAL CAROTIDS:         No visible stenosis or aneurysm.  ANTERIOR CEREBRALS:         No visible stenosis or aneurysm.  ANTERIOR COMMUNICATING:  No visible stenosis or aneurysm.  MIDDLE CEREBRALS:         No visible stenosis or aneurysm.  POSTERIOR COMMUNICATING: No visible stenosis or aneurysm.  SUPERIOR CEREBELLARS:         No visible stenosis or aneurysm.  BASILAR:             No visible stenosis or aneurysm.  INTRACRANIAL VERTEBRALS: No visible significant stenosis or dissection.     MRA NECK  COMMON CAROTID:                 Normal for age with no visible significant stenosis or dissection.  CERVICAL INTERNAL CAROTID:  Normal for  age with no visible significant stenosis or dissection.  EXTERNAL CAROTID:               Normal for age with no visible significant stenosis or dissection.  CERVICAL VERTEBRAL:               Normal for age with no visible significant stenosis or dissection.                   Impression   CONCLUSION:  No acute abnormality on MR angiography of the head and neck.     Skin bx report does not have (arm, hip, and leg)    Lower lip biopsy neg [facility]    Electromyelogram and nerve conduction study (EMG/NCS) Conclusion:   This is a normal study with no electrodiagnostic evidence of a   large fiber polyneuropathy affesting right UE or LE at this time.       ______________________   Kvng Cao MD   Neuromuscular Fellow     This test was done under my supervision and I personally reviewed   the interpretation of the study.     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 She reports symptoms continue to progress insidiously with right face/arm/leg paresthesias, with waxing and waning right arm/face/leg mobility impairment (slight improvement compared to last visit spontaneously), swallowing troubles intermittently. Symptoms worse with activity. She recently realized there is a family history with similar constellation of symptoms. Thinks leflunomide may have slowed sx progression, unable to tolerate prednisone wean (neuro sxs worsened from 9 to 8, back to 9 daily). Exam with reduced pinprick  right face/arm, normal reflexes. Recent sweat test with reduced sweat on right side suggestive of small fiber neuropathy affecting sudomotor fibers. Recent skin biopsy reportedly normal (per Dr. Romano note). Lip biopsy result neg ([facility]). Neuroaxis imaging MRI brain, MRA head/neck, MRI C spine, MRI T spine with degenerative changes causing moderate central canal stenosis cervical. Blood work unrevealing thus far.     Clinical picture concerning for small fiber neuropathy of unclear etiology, seems steroid and leflunomide responsive. Given lack of long tract sign waxing and waning sensory sxs seem more likely secondary to small fiber neuropathy rather than known cervical degenerative disease, may be multifactorial. She continues to see [facility] soon for eval of overall picture. Will also refer to my colleagues at the [facility] to look into possible autonomic testing and help in quest for etiology (Dr. Patterson or Cade).      Plan:  -Go to the lab when able, swallow eval  -[facility] eval, consider [facility] autonomics specialists, has appt with DR Justin next year if needed. Consider gastric emptying study?  -Continue seeing neurosurgery for opinions re cervical canal narrowing  -May consider nerve biopsy in future pending work up, empirically escalating rheumatologic disease modifying therpay (DMT) with goal of slowing progression and preventing flares, autonomics, rheumatology, genetics (Janell Goldy [facility]- gave name)    Encounter Diagnoses   Name Primary?    Small fiber neuropathy Yes    Abnormality of globulin     Low ferritin     Spondylosis of cervical region without myelopathy or radiculopathy      Total time 49 minutes including chart review, eliciting history, physical exam, and counseling.    Jeannette Bruno, DO

## 2024-06-07 NOTE — PATIENT INSTRUCTIONS
-Go to the lab when able, swallow eval  -[facility] eval, consider [facility] autonomics specialists, has appt with DR Justin next year if needed. Consider gastric emptying study?  -Continue seeing neurosurgery for opinions re cervical canal narrowing  -May consider nerve biopsy in future pending work up, empirically escalating rheumatologic disease modifying therpay (DMT) with goal of slowing progression and preventing flares, autonomics, rheumatology, genetics    Refill policies:    Allow 2-3 business days for refills; controlled substances may take longer.  Contact your pharmacy at least 5 days prior to running out of medication and have them send an electronic request or submit request through the “request refill” option in your [employer] account.  Refills are not addressed on weekends; covering physicians do not authorize routine medications on weekends.  No narcotics or controlled substances are refilled after noon on Fridays or by on call physicians.  By law, narcotics must be electronically prescribed.  A 30 day supply with no refills is the maximum allowed.  If your prescription is due for a refill, you may be due for a follow up appointment.  To best provide you care, patients receiving routine medications need to be seen at least once a year.  Patients receiving narcotic/controlled substance medications need to be seen at least once every 3 months.  In the event that your preferred pharmacy does not have the requested medication in stock (e.g. Backordered), it is your responsibility to find another pharmacy that has the requested medication available.  We will gladly send a new prescription to that pharmacy at your request.    Scheduling Tests:    If your physician has ordered radiology tests such as MRI or CT scans, please contact Central Scheduling at [phone] right away to schedule the test.  Once scheduled, the [facility] Centralized Referral Team will work with your insurance carrier to  obtain pre-certification or prior authorization.  Depending on your insurance carrier, approval may take 3-10 days.  It is highly recommended patients assure they have received an authorization before having a test performed.  If test is done without insurance authorization, patient may be responsible for the entire amount billed.      Precertification and Prior Authorizations:  If your physician has recommended that you have a procedure or additional testing performed the Counts include 234 beds at the [facility] Centralized Referral Team will contact your insurance carrier to obtain pre-certification or prior authorization.    You are strongly encouraged to contact your insurance carrier to verify that your procedure/test has been approved and is a COVERED benefit.  Although the Counts include 234 beds at the [facility] Centralized Referral Team does its due diligence, the insurance carrier gives the disclaimer that \"Although the procedure is authorized, this does not guarantee payment.\"    Ultimately the patient is responsible for payment.   Thank you for your understanding in this matter.  Paperwork Completion:  If you require FMLA or disability paperwork for your recovery, please make sure to either drop it off or have it faxed to our office at [phone]. Be sure the form has your name and date of birth on it.  The form will be faxed to our Forms Department and they will complete it for you.  There is a 25$ fee for all forms that need to be filled out.  Please be aware there is a 10-14 day turnaround time.  You will need to sign a release of information (GOMEZ) form if your paperwork does not come with one.  You may call the Forms Department with any questions at [phone].  Their fax number is [phone].

## 2024-09-27 ENCOUNTER — LAB ENCOUNTER (OUTPATIENT)
Dept: LAB | Facility: HOSPITAL | Age: 44
End: 2024-09-27
Attending: STUDENT IN AN ORGANIZED HEALTH CARE EDUCATION/TRAINING PROGRAM
Payer: COMMERCIAL

## 2024-09-27 ENCOUNTER — OFFICE VISIT (OUTPATIENT)
Dept: NEUROLOGY | Facility: CLINIC | Age: 44
End: 2024-09-27
Payer: COMMERCIAL

## 2024-09-27 ENCOUNTER — TELEPHONE (OUTPATIENT)
Dept: NEUROLOGY | Facility: CLINIC | Age: 44
End: 2024-09-27

## 2024-09-27 VITALS
BODY MASS INDEX: 27 KG/M2 | SYSTOLIC BLOOD PRESSURE: 100 MMHG | DIASTOLIC BLOOD PRESSURE: 76 MMHG | HEART RATE: 78 BPM | OXYGEN SATURATION: 96 % | WEIGHT: 147 LBS

## 2024-09-27 DIAGNOSIS — M04.1 PERIODIC FEVER (HCC): ICD-10-CM

## 2024-09-27 DIAGNOSIS — G62.9 SMALL FIBER NEUROPATHY: Primary | ICD-10-CM

## 2024-09-27 DIAGNOSIS — M25.50 PAIN IN JOINT, MULTIPLE SITES: ICD-10-CM

## 2024-09-27 DIAGNOSIS — R77.1 ABNORMALITY OF GLOBULIN: ICD-10-CM

## 2024-09-27 DIAGNOSIS — R21 RASH: ICD-10-CM

## 2024-09-27 DIAGNOSIS — M47.812 SPONDYLOSIS OF CERVICAL REGION WITHOUT MYELOPATHY OR RADICULOPATHY: ICD-10-CM

## 2024-09-27 DIAGNOSIS — G62.9 SMALL FIBER NEUROPATHY: ICD-10-CM

## 2024-09-27 DIAGNOSIS — R79.0 LOW FERRITIN: ICD-10-CM

## 2024-09-27 LAB
IGA SERPL-MCNC: 131.8 MG/DL (ref 40–350)
IGM SERPL-MCNC: 50.5 MG/DL (ref 50–300)
IMMUNOGLOBULIN PNL SER-MCNC: 593 MG/DL (ref 650–1600)
IRON SATN MFR SERPL: 17 %
IRON SERPL-MCNC: 63 UG/DL
TOTAL IRON BINDING CAPACITY: 370 UG/DL (ref 250–425)
TRANSFERRIN SERPL-MCNC: 293 MG/DL (ref 250–380)
VIT D+METAB SERPL-MCNC: 48.4 NG/ML (ref 30–100)

## 2024-09-27 PROCEDURE — 82784 ASSAY IGA/IGD/IGG/IGM EACH: CPT

## 2024-09-27 PROCEDURE — 3078F DIAST BP <80 MM HG: CPT | Performed by: STUDENT IN AN ORGANIZED HEALTH CARE EDUCATION/TRAINING PROGRAM

## 2024-09-27 PROCEDURE — 36415 COLL VENOUS BLD VENIPUNCTURE: CPT

## 2024-09-27 PROCEDURE — 3074F SYST BP LT 130 MM HG: CPT | Performed by: STUDENT IN AN ORGANIZED HEALTH CARE EDUCATION/TRAINING PROGRAM

## 2024-09-27 PROCEDURE — 83520 IMMUNOASSAY QUANT NOS NONAB: CPT

## 2024-09-27 PROCEDURE — 83516 IMMUNOASSAY NONANTIBODY: CPT

## 2024-09-27 PROCEDURE — 83550 IRON BINDING TEST: CPT

## 2024-09-27 PROCEDURE — 99215 OFFICE O/P EST HI 40 MIN: CPT | Performed by: STUDENT IN AN ORGANIZED HEALTH CARE EDUCATION/TRAINING PROGRAM

## 2024-09-27 PROCEDURE — 82164 ANGIOTENSIN I ENZYME TEST: CPT

## 2024-09-27 PROCEDURE — 82306 VITAMIN D 25 HYDROXY: CPT

## 2024-09-27 PROCEDURE — 83540 ASSAY OF IRON: CPT

## 2024-09-27 NOTE — TELEPHONE ENCOUNTER
Nursing please do the following and then update patient when complete:   1- fax misc lab orders and referral to genetics counselor to Janell Winslow  2- [facility]    Thank you! Jeannette

## 2024-09-27 NOTE — PATIENT INSTRUCTIONS
-Blood work  -Continue seeing genetics: advise whole genome sequencing and  genetic panel for cyclical fevers  -[employer] [facility] [facility],    Refill policies:    Allow 2-3 business days for refills; controlled substances may take longer.  Contact your pharmacy at least 5 days prior to running out of medication and have them send an electronic request or submit request through the “request refill” option in your My [employer] account.  Refills are not addressed on weekends; covering physicians do not authorize routine medications on weekends.  No narcotics or controlled substances are refilled after noon on Fridays or by on call physicians.  By law, narcotics must be electronically prescribed.  A 30 day supply with no refills is the maximum allowed.  If your prescription is due for a refill, you may be due for a follow up appointment.  To best provide you care, patients receiving routine medications need to be seen at least once a year.  Patients receiving narcotic/controlled substance medications need to be seen at least once every 3 months.  In the event that your preferred pharmacy does not have the requested medication in stock (e.g. Backordered), it is your responsibility to find another pharmacy that has the requested medication available.  We will gladly send a new prescription to that pharmacy at your request.    Scheduling Tests:    If your physician has ordered radiology tests such as MRI or CT scans, please contact Central Scheduling at [phone] right away to schedule the test.  Once scheduled, the [facility] Centralized Referral Team will work with your insurance carrier to obtain pre-certification or prior authorization.  Depending on your insurance carrier, approval may take 3-10 days.  It is highly recommended patients assure they have received an authorization before having a test performed.  If test is done without insurance authorization, patient may be responsible for the  entire amount billed.      Precertification and Prior Authorizations:  If your physician has recommended that you have a procedure or additional testing performed the [facility] Centralized Referral Team will contact your insurance carrier to obtain pre-certification or prior authorization.    You are strongly encouraged to contact your insurance carrier to verify that your procedure/test has been approved and is a COVERED benefit.  Although the [facility] Centralized Referral Team does its due diligence, the insurance carrier gives the disclaimer that \"Although the procedure is authorized, this does not guarantee payment.\"    Ultimately the patient is responsible for payment.   Thank you for your understanding in this matter.  Paperwork Completion:  If you require FMLA or disability paperwork for your recovery, please make sure to either drop it off or have it faxed to our office at [phone]. Be sure the form has your name and date of birth on it.  The form will be faxed to our Forms Department and they will complete it for you.  There is a 25$ fee for all forms that need to be filled out.  Please be aware there is a 10-14 day turnaround time.  You will need to sign a release of information (GOMEZ) form if your paperwork does not come with one.  You may call the Forms Department with any questions at [phone].  Their fax number is [phone].

## 2024-09-27 NOTE — PROGRESS NOTES
Neurology outpt follow up    Interval history September 27, 2024:   [facility] advised really slow changes over time to increase exercise, PT brachial plexus stretches, teaching body how to rest. Remove undue stress from body. Started working with chiropractor, started probiotic very helpful. Feeling a bit better.     Dr. Gipson felt leflunomide not working as well. Agreed to finish work up from [facility]. Dr. Romano questioned whether could be IL 1 related disorder, then rheumatology advised genetic testing. Now thinking grandmother probably had this as well. Trying to wean steroids, down to 5, couldn't tolerate 4.     Component      Latest Ref Rng 4/12/2024   PROTEIN, TOTAL      5.7 - 8.2 g/dL 6.7    Albumin      3.75 - 5.21 g/dL 4.35    ALPHA-1-GLOBULINS      0.19 - 0.46 g/dL 0.27    ALPHA-2-GLOBULINS      0.48 - 1.05 g/dL 0.67    BETA GLOBULINS      0.68 - 1.23 g/dL 0.82    GAMMA GLOBULINS      0.62 - 1.70 g/dL 0.58 (L)    ALBUMIN/GLOBULIN RATIO      1.00 - 2.00  1.85    SPE INTERPRETATION No apparent monoclonal protein on serum electrophoresis.    IMMUNOFIXATION No monoclonal protein detected by immunofixation.…    KAPPA FREE LIGHT CHAIN      0.330 - 1.940 mg/dL 1.093    LAMBDA FREE LIGHT CHAIN      0.571 - 2.630 mg/dL 0.893    KAPPA/LAMBDA FLC RATIO      0.26 - 1.65  1.22    Cholesterol, Total      <200 mg/dL 232 (H)    HDL Cholesterol      40 - 59 mg/dL 60 (H)    Triglycerides      30 - 149 mg/dL 173 (H)    LDL Cholesterol Calc      <100 mg/dL 141 (H)    VLDL      0 - 30 mg/dL 32 (H)    NON-HDL CHOLESTEROL      <130 mg/dL 172 (H)    Patient Fasting for Lipid? Yes    Cadmium Ur      None detected ug/L None Detected    Mercury Ur      0 - 19 ug/L None Detected    Arsenic Total Ur      0 - 9 ug/L None Detected    Lead Ur      0 - 49 ug/L None Detected    Creatinine, Urine      0.30 - 3.00 g/L 0.41    Lead Blood      0.0 - 3.4 ug/dL <1.0    ARSENIC BLOOD      0 - 9 ug/L 2    Mercury Blood      0.0 - 14.9  ug/L <1.0    Cadmium, Blood      0.0 - 1.2 ug/L <0.5    Vit E Alpha [place]onne      7.0 - 25.1 mg/L 13.3    Vit E Gamma [place]      0.5 - 5.5 mg/L 1.7    Vitamin B12      193 - 986 pg/mL 267    VITAMIN B6      3.4 - 65.2 ug/L 4.5    VITAMIN B1 (THIAMINE), WHOLE B      66.5 - 200.0 nmol/L 130.2    TSH      0.358 - 3.740 mIU/mL 0.785    FERRITIN      12.0 - 240.0 ng/mL 7.4 (L)    Anti-SSA Antibody, IGG      <7 U/mL <0.4    Anti-SSB Antibody, IGG      <7 U/mL <0.4    Lyme Screen IgG and IgM      Negative  Negative    Gliadin Deamidated IgA Ab      <7.0 U/mL 0.5    MISC TEST SEE COMMENTS       Legend:  (L) Low  (H) High    Neur review of systems:  -Cognition: more word finding troubles over months, has had a couple instances where could not bring words (could not find words, words on tip of tongue), has had a couple times where really got stuck lasting a couple minutes, no other associated symptoms that were more pronounced  -Fatigue: yes  -Pain: sharp pains right side of face, thought it was teeth (dental workup unrevealing), pain consistent with some zapping, could trigger with palpation to certain area, lasted for a couple weeks 11/2023, self resolved, neck pain  -Depression/anxiety: no big changes, some anxiety in setting of unknown  -Vision: she reports floaters periodically when in shower confident in both eyes, getting out of shower. Floaters improved with getting out of shower and cooling off (some associated lightheadedness). Intermittently will see something in peripheral vision not moving (no confusion, bowel/bladder loss of control). Usually happens when standing. Eye doctor doesn't see any proteins floating around per patient.   -Dysphagia/dysarthria: intermittent trouble with solids/liquids similar (better on lefluonomide 20 mg, no major aspiration issues since 1/2023), couple episodes of reflux in last month  -GI: early satiety, acid reflux without clear provocation  -Motor: right arm/face/leg weakness,  previously better on leflunomide, worse at last visit/better now.   -Sensory: right arm/face/leg numbness and tingling getting worse overall, rare tingling on left side of foot but not as pronounced. Right arm tremor improved on leflunomide but still slightly evident with postural hold   -Ambulation: sometimes feels like needs a cane, some close calls with walking. Last 2023 fell in January- legs gave out (getting out of car, no premonitory signs, no passing out, gets right back up and back to normal immediately, two similar episodes in the fall), when feeling weak feels a little wobbly   -Bowel: no bleeding in awhile, feels body has adjusted to leflunomide (previously some loose stools in AM)  -Bladder: thinks she feels fully empties bladder, no recent infections, no urinary frequency  -Lhermittes: some localized shoulder pito  -Tobacco: no  -Exercise: stretches almost daily, hasn't done a whole lot more than that as seems to aggravate symptoms    Leflunomide last incresaed 2023. Started 10/2022 didn't work inT-System.     PAST MEDICAL HISTORY:  Past Medical History:    Anxiety    Arthritis    Unspecified autoimmune arthritis    Asthma (HCC)    Mild    Connective tissue disease (HCC)    Esophageal reflux    Improved since beginning Leflunomide    Fibromyalgia    IBS (irritable bowel syndrome)    Osteoarthritis    Recent finding of cervical spinal cord compression       PAST SURGICAL HISTORY:  Past Surgical History:   Procedure Laterality Date             FAMILY HISTORY:  family history includes Dementia in her paternal grandmother; Depression in her mother; Diabetes in her paternal grandmother; Gastrointestinal Cancer in her father; Heart Disorder in her maternal grandfather; Hyperlipidemia in her mother; Ovarian Cancer in her paternal aunt.  FMHx: Paternal first cousin struggling for 15 years with similar course, diagnosed with psoriatic arthritis, also with paternal uncle with some sort of  autoimmune dysfunction. Paternal uncle had stroke in 50s in setting of psoriatic arthritis. Another paternal cousin with psoriasis    SOCIAL HISTORY:   reports that she has never smoked. She has never been exposed to tobacco smoke. She has never used smokeless tobacco. She reports that she does not currently use alcohol. She reports that she does not use drugs. Works in social work.     ALLERGIES:  No Known Allergies    MEDICATIONS:  Prior to Admission Medications   Medication Sig    cyanocobalamin 1000 MCG Oral Tab Take 1 tablet (1,000 mcg total) by mouth daily.    albuterol 108 (90 Base) MCG/ACT Inhalation Aero Soln Inhale 2 puffs into the lungs every 4 to 6 hours.    predniSONE 5 MG Oral Tab Take 1 tablet (5 mg total) by mouth daily.    ibuprofen (ADVIL) 200 MG Oral Tab Take 4 tablets (800 mg total) by mouth as needed for Pain.    leflunomide 20 MG Oral Tab Take 1 tablet (20 mg total) by mouth daily.    BuPROPion HCl ER, XL, 150 MG Oral Tablet 24 Hr 1 tablet (150 mg total)  in the morning and 1 tablet (150 mg total) before bedtime.    alprazolam 0.25 MG Oral Tab Take 1 tablet (0.25 mg total) by mouth nightly as needed for Sleep.    predniSONE 1 MG Oral Tab Take 4 tablets (4 mg total) by mouth daily. (Patient not taking: Reported on 9/27/2024)     No current facility-administered medications for this visit.         REVIEW OF SYSTEMS:  A 10-point system was reviewed.  Pertinent positives and negatives are noted in HPI.      PHYSICAL EXAMINATION:  VITAL SIGNS: /76   Pulse 78   Wt 147 lb (66.7 kg)   LMP 05/17/2024   SpO2 96%   BMI 26.89 kg/m²   General: Alert, good recall of personal medical history    Respiratory: Non labored breathing on room air    Cardiovascular: Regular rate    Mental status: Alert, oriented, language fluent, comprehension intact    Cranial nerves: Optic disc margins sharp VF full to confrontation bilaterally. Pupils equal, round, equally reactive to light and accommodation.  Extraocular eye movements intact without nystagmus. Face sensation intact to light touch. Face strength symmetric and intact. No dysarthria.    Motor:   Power:   -Right upper extremity: shoulder abductors 5, finger extension 5  -Left upper extremity: shoulder abductors 5, finger extension 5  -Right lower extremity: hip flexion 5, dorsiflexion 5  -Left lower extremity:  hip flexion 5, dorsiflexion 5  Tone: Normal   Bulk: Normal  Abnormal movements: None    Sensory: Intact to light touch, vib >20s except right great toe 12s    Coordination: Finger to nose and heel to shin intact.    Reflexes: Right/Left: Biceps 2/2, triceps 2/2, brachioradialis 2/2. Patella 2/2, achilles 2/2,.   Gait: Narrow based, symmetric arm swing, no gait assist    DIAGNOSTIC DATA:      Component      Latest Ref Rn 3/13/2024   Hbsag Screen Index <0.10    HBSAg Screen      Nonreactive   Nonreactive    HEPATITIS B SURFACE AB QUAL      Reactive   Reactive    HEPATITIS B SURFACE AB QUANT      mIU/mL 118.53    HEPATITIS B CORE AB, TOTAL      Nonreactive   Nonreactive    MYELOPEROX ANTIBODIES, IGG      0.0 - 0.9 units <0.2    SERINE PROTEASE3, IGG      0.0 - 0.9 units <0.2    Cytoplasmic (C-ANCA)      Neg:<1:20 titer <1:20    Perinuclear (P-ANCA)      Neg:<1:20 titer <1:20    ATYPICAL PANCA      Neg:<1:20 titer <1:20    Expanded GUNJAN Antibody Screen, IGG      <0.7 ug/l 0.10    Anti-dsDNA antibody      <10 IU/mL 3.1    Connective Tissue Disease Screen Interpretation      Negative  Negative    HEMOGLOBIN A1c      <5.7 % 5.2    ESTIMATED AVERAGE GLUCOSE      68 - 126 mg/dL 103    CALCIUM, IONIZED      4.5 - 5.6 mg/dL 4.9    CK      26 - 192 U/L 34    COMPLEMENT C4      10.0 - 40.0 mg/dL 37.1    COMPLEMENT C3      90.0 - 180.0 mg/dL 143.0    HCV AB      Non Reactive  Non Reactive    RHEUMATOID FACTOR      <15 IU/mL <10    Cryoglob Ql      None detected  Comment    HIV Antigen Antibody Combo      Non-Reactive  Non-Reactive    HCV Neg Interp Comment       IMAGING:  MRI brain wo, MRI lumbar wo (3/29/2022):  Impression: Normal MRI of brain with contrast.     Impression:     1.  Mild disc desiccation without disc herniation, central stenosis, or foraminal narrowing throughout lumbar spine.  See above discussion.     2.  Normal conus medullaris.  No focal collections are noted in lumbar spinal canal.     3.  Normal alignment.  Vertebral body height and marrow signal appear normal.     CTA head/neck 7/3/22  Impression:   1. Patency of the intracranial arterial circulation   2. Patency of the arterial vasculature of the neck.   3. No acute intracranial abnormality.   See above details/description of other findings. Please clinically correlate.     MR brain 2/2022:   FINDINGS:   No restricted diffusion to suggest acute intracranial process.     No abnormal parenchymal or leptomeningeal enhancement.     There is no acute intracranial hemorrhage, extracerebral fluid collection, or significant midline shift.     Ventricles and sulci are normal in size and configuration for the patient's age. No focal white matter lesion     The visualized paranasal sinuses and mastoid air cells are clear.     Partially visualized cervical spondylosis in the mid cervical spine with associated mild spinal canal stenosis     IMPRESSION:     1.  No acute intracranial abnormality.     2.  No white matter lesion or abnormal enhancement     - OSH (1/25/2022): neg (JUNE, RF, CCP), normal CRP and ESR  MRI wwo R hand (2/22/23): wnl, no signs of synovitis    Per 2022 note \" Neurology [facility] who ordered MRI cervical which per patient was normal maybe slight difference is disc. EMG normal.\"     MRI SPINE THORACIC (W+WO) (CPT=72157)    Result Date: 3/17/2024  CONCLUSION:  Mild posterior degenerative disc bulging at T7-8.  There is no significant central canal stenosis or nerve root impingement.   LOCATION:  [facility]    Dictated by (CST): Luis Peralta MD on 3/17/2024 at 1:18 PM     Finalized by  (CST): Luis Peralta MD on 3/17/2024 at 1:20 PM       MRI SPINE CERVICAL (W+WO) (CPT=72156)    Result Date: 3/17/2024  CONCLUSION:   1. Moderate degenerative disc bulge/osteophyte complex at C5-6 causing moderate central canal stenosis and mild bilateral neural foraminal narrowing.  2. There have disc bulge/osteophyte complex at C6-7 eccentric to the right lateral aspect of the disc space.  There is moderate central canal stenosis and moderate right and mild left neural foraminal narrowing.   LOCATION:  [facility]   Dictated by (CST): Luis Peralta MD on 3/17/2024 at 1:04 PM     Finalized by (CST): Luis Peralta MD on 3/17/2024 at 1:09 PM        FINDINGS:    MRI BRAIN  INTRACRANIAL:  There are no focal parenchymal brain abnormalities.  Diffusion weighted imaging was performed and is unremarkable.  There is no evidence for acute infarction.  There is no evidence of hemorrhage or mass lesion.  No lesions within Meckel's  cave are noted.  VENTRICLES/SULCI:   Ventricles and sulci are normal in caliber.  There are no extra-axial fluid collections.  There is no midline shift.  SINUSES/ORBITS:       The visualized paranasal sinuses are clear.  The orbits are unremarkable.  MASTOIDS:       The mastoids are clear.     MRA BRAIN  INTRACRANIAL CAROTIDS:         No visible stenosis or aneurysm.  ANTERIOR CEREBRALS:         No visible stenosis or aneurysm.  ANTERIOR COMMUNICATING:  No visible stenosis or aneurysm.  MIDDLE CEREBRALS:         No visible stenosis or aneurysm.  POSTERIOR COMMUNICATING: No visible stenosis or aneurysm.  SUPERIOR CEREBELLARS:         No visible stenosis or aneurysm.  BASILAR:             No visible stenosis or aneurysm.  INTRACRANIAL VERTEBRALS: No visible significant stenosis or dissection.     MRA NECK  COMMON CAROTID:                 Normal for age with no visible significant stenosis or dissection.  CERVICAL INTERNAL CAROTID:  Normal for age with no visible significant stenosis or  dissection.  EXTERNAL CAROTID:               Normal for age with no visible significant stenosis or dissection.  CERVICAL VERTEBRAL:               Normal for age with no visible significant stenosis or dissection.                   Impression   CONCLUSION:  No acute abnormality on MR angiography of the head and neck.     Skin bx report does not have (arm, hip, and leg)    Lower lip [facility] neg [facility]    Electromyelogram and nerve conduction study (EMG/NCS) Conclusion:   This is a normal study with no electrodiagnostic evidence of a   large fiber polyneuropathy affesting right UE or LE at this time.       ______________________   Kvng Cao MD   Neuromuscular Fellow     This test was done under my supervision and I personally reviewed   the interpretation of the study.     _____________________   Mark Richardson MD   EMG Attending     Recent work-up at [facility]:  -Rheumatology etiology of small fiber neuropathy remains unclear, advised ACE testing. Regarding cyclical low grade fevers and arthralgias, advised periodic fever syndrome testing and whole genome sequencing with genetics. Arthalgias felt possible 2/2 hypermobility (possible dysautonomia? Associated)  -Neurology Dr Caro felt brachial plexu entrapment neurogenic tos, diffuse pain with wide spread central sensitization, dpondylosis with arthorpathy and paraspinal myofascial pain, advised posture, core training, rom exercises, PT, biofeedback.     Recent work up [facility]:   -Dr Romano concerned for cytokine disruption, e.g., IL 1 in setting of intermittent fevers, weakness. Supported whole genome sequencing, advised autonomics specialist.     ASSESSMENT/PLAN:  Morelia Camarillo is a 44Yrs old female with a medical history of unspecified connective tissue disease (on leflunomide, rheumatologists with mixed opinions), cervical myelopathy (Moderate degenerative disc bulge at c5-6 with moderate central canal stenosis), small fiber neuropathy  (etiology unclear), history of reported vasculitis with skin manifestations (patient reports old photos suspicious), fibromyalgia, and IBS patient who presents for right sided paresthesias and intermittent weakness.. Symptoms worse with activity. She recently realized there is a family history with similar constellation of symptoms, paternal cousin and paternal grandmother. Thinks leflunomide may have slowed sx progression, unable to tolerate prednisone wean (neuro sxs worsened from 5 to 4). Sweat test with reduced sweat on right side suggestive of small fiber neuropathy affecting sudomotor fibers. Recent skin biopsy reportedly normal (per Dr. Romano note). Lip biopsy result neg ([facility]). Neuroaxis imaging MRI brain, MRA head/neck, MRI C spine, MRI T spine with degenerative changes causing moderate central canal stenosis cervical. Blood work unrevealing thus far. Clinical picture with small fiber neuropathy of unclear etiology. Given lack of long tract sign waxing and waning sensory sxs seem more likely secondary to small fiber neuropathy rather than known cervical degenerative disease, may be multifactorial.    She has seen many specialists, agree with other specialists, given family history and constellation of symptoms of unclear etiology, recommend whole genome sequencing and panel to assess for cyclical fever syndromes. Also advise autonomics specialist as previously advised. She is to continue following with multidisciplinary team and let me know if new symptoms.     Plan:  -Blood work- ACE, vit D  -Continue seeing genetics: advise whole genome sequencing and  genetic panel for cyclical fevers  -[employer], autonomics specialists,    Encounter Diagnoses   Name Primary?    Small fiber neuropathy Yes    Pain in joint, multiple sites     Periodic fever (HCC)     Rash      Total time 46 minutes including chart review, eliciting history, physical exam, and counseling.    Jeannette Bruno, DO

## 2024-09-30 LAB — ACE: <15 U/L

## 2024-09-30 NOTE — TELEPHONE ENCOUNTER
Per request faxed    [facility] lab order and referral for genetic testing to [phone], with confirmation  fax back.    Faxed referral and LOV to Dr. Korey Mohamud at [phone] with confirmation received.     Patient notified and verbalized understanding.

## 2024-10-03 LAB
GD1B ANTIBODY, IGG: 4 IV
GD1B ANTIBODY, IGM: 4 IV
GM1 ANTIBODY, IGG: 4 IV
GM1 ANTIBODY, IGM: 6 IV
GQ1B ANTIBODY, IGG: 3 IV
GQ1B ANTIBODY, IGM: 3 IV
MAG IGM AUTOANTIBODIES: <900 BTU

## 2024-10-08 ENCOUNTER — HOSPITAL ENCOUNTER (EMERGENCY)
Facility: HOSPITAL | Age: 44
Discharge: HOME | End: 2024-10-08
Attending: EMERGENCY MEDICINE
Payer: COMMERCIAL

## 2024-10-08 VITALS
OXYGEN SATURATION: 95 % | TEMPERATURE: 98 F | RESPIRATION RATE: 16 BRPM | WEIGHT: 135 LBS | HEIGHT: 62 IN | BODY MASS INDEX: 24.84 KG/M2 | DIASTOLIC BLOOD PRESSURE: 84 MMHG | SYSTOLIC BLOOD PRESSURE: 135 MMHG | HEART RATE: 92 BPM

## 2024-10-08 DIAGNOSIS — M54.16 LUMBAR RADICULOPATHY: Primary | ICD-10-CM

## 2024-10-08 PROCEDURE — 96374 THER/PROPH/DIAG INJ IV PUSH: CPT

## 2024-10-08 PROCEDURE — 99284 EMERGENCY DEPT VISIT MOD MDM: CPT

## 2024-10-08 PROCEDURE — 96375 TX/PRO/DX INJ NEW DRUG ADDON: CPT

## 2024-10-08 PROCEDURE — 96361 HYDRATE IV INFUSION ADD-ON: CPT

## 2024-10-08 RX ORDER — DIAZEPAM 10 MG
10 TABLET ORAL ONCE
Status: COMPLETED | OUTPATIENT
Start: 2024-10-08 | End: 2024-10-08

## 2024-10-08 RX ORDER — KETOROLAC TROMETHAMINE 15 MG/ML
15 INJECTION, SOLUTION INTRAMUSCULAR; INTRAVENOUS ONCE
Status: COMPLETED | OUTPATIENT
Start: 2024-10-08 | End: 2024-10-08

## 2024-10-08 RX ORDER — DEXAMETHASONE SODIUM PHOSPHATE 10 MG/ML
10 INJECTION, SOLUTION INTRAMUSCULAR; INTRAVENOUS ONCE
Status: COMPLETED | OUTPATIENT
Start: 2024-10-08 | End: 2024-10-08

## 2024-10-08 RX ORDER — MORPHINE SULFATE 4 MG/ML
4 INJECTION, SOLUTION INTRAMUSCULAR; INTRAVENOUS ONCE
Status: COMPLETED | OUTPATIENT
Start: 2024-10-08 | End: 2024-10-08

## 2024-10-08 RX ORDER — HYDROCODONE BITARTRATE AND ACETAMINOPHEN 5; 325 MG/1; MG/1
1 TABLET ORAL EVERY 8 HOURS PRN
Qty: 15 TABLET | Refills: 0 | Status: SHIPPED | OUTPATIENT
Start: 2024-10-08 | End: 2024-10-13

## 2024-10-08 RX ORDER — DIAZEPAM 2 MG
2 TABLET ORAL 3 TIMES DAILY PRN
Qty: 15 TABLET | Refills: 0 | Status: SHIPPED | OUTPATIENT
Start: 2024-10-08 | End: 2024-10-15

## 2024-10-08 RX ORDER — IBUPROFEN 600 MG/1
600 TABLET, FILM COATED ORAL EVERY 8 HOURS PRN
Qty: 30 TABLET | Refills: 0 | Status: SHIPPED | OUTPATIENT
Start: 2024-10-08 | End: 2024-10-15

## 2024-10-08 NOTE — ED PROVIDER NOTES
Patient Seen in: [facility] Emergency Department      History     Chief Complaint   Patient presents with    Back Pain     Stated Complaint: coming from chiropractor.  lower back pain x 1 week    Subjective:   44-year-old female, presents with left-sided low back pain with radicular symptoms.  Started about a week ago.  Was on a Medrol Dosepak with some Norco with little improvement.  Went to the chiropractor today, recommend she come here for possible MRI and pain management.  No falls.  No trauma.  No history of low back surgery.  No fevers.  No incontinence or retention.  No diarrhea constipation.            Objective:     Past Medical History:    Anxiety    Arthritis    Unspecified autoimmune arthritis    Asthma (HCC)    Mild    Connective tissue disease (HCC)    Esophageal reflux    Improved since beginning Leflunomide    Fibromyalgia    IBS (irritable bowel syndrome)    Osteoarthritis    Recent finding of cervical spinal cord compression              Past Surgical History:   Procedure Laterality Date                      Social History     Socioeconomic History    Marital status:    Tobacco Use    Smoking status: Never     Passive exposure: Never    Smokeless tobacco: Never   Vaping Use    Vaping status: Never Used   Substance and Sexual Activity    Alcohol use: Not Currently    Drug use: Never   Other Topics Concern    Caffeine Concern Yes     Comment: coffee 1-2 cups    Stress Concern Yes     Comment: Unknown medical concern is stressful    Weight Concern Yes     Comment: Weight gain with steroid use    Special Diet No    Exercise Yes     Comment: walking and stretching    Seat Belt No     Social Determinants of Health     Food Insecurity: Low Risk  (3/28/2022)    Received from [facility]    Food Insecurity     Have there been times that your food ran out, and you didn't have money to get more?: No     Are there times that you worry that this might happen?: No    Transportation Needs: Low Risk  (3/28/2022)    Received from [facility]    Transportation Needs     Do you have trouble getting transportation to medical appointments?: No    Received from [facility]                  Physical Exam     ED Triage Vitals [10/08/24 1050]   /84   Pulse 92   Resp 16   Temp 97.7 °F (36.5 °C)   Temp src Temporal   SpO2 95 %   O2 Device        Current Vitals:   Vital Signs  BP: 135/84  Pulse: 92  Resp: 16  Temp: 97.7 °F (36.5 °C)  Temp src: Temporal    Oxygen Therapy  SpO2: 95 %        Physical Exam  Vitals and nursing note reviewed.   Constitutional:       General: She is not in acute distress.  HENT:      Head: Normocephalic.      Mouth/Throat:      Mouth: Mucous membranes are moist.   Cardiovascular:      Rate and Rhythm: Normal rate.      Heart sounds: Normal heart sounds.   Pulmonary:      Effort: Pulmonary effort is normal. No respiratory distress.      Breath sounds: Normal breath sounds.   Abdominal:      General: There is no distension.      Palpations: Abdomen is soft.      Tenderness: There is no abdominal tenderness.   Musculoskeletal:         General: Normal range of motion.      Cervical back: Neck supple.   Skin:     General: Skin is warm and dry.      Findings: No bruising, erythema or rash.   Neurological:      General: No focal deficit present.      Mental Status: She is alert.      Sensory: No sensory deficit.      Coordination: Coordination normal.      Deep Tendon Reflexes: Reflexes normal.       Reproduces tenderness in the left piriformis and gluteus region.  No midline pain or step-offs.  2+ DTRs.  Straight leg positive at about 40 degrees.    ED Course   Labs Reviewed - No data to display                MDM        Differential diagnosis includes, but not limited to, muscle spasm, sciatica, fracture    External chart review demonstrates her telephone encounters and visits with neurology    44-year-old female with  radicular symptoms in the lumbar region.  Tenderness over the gluteus and piriformis.  No skin changes.  Reflexes intact.  No red flag symptoms for spinal emergency.  Abdomen is soft.  Given IV fluids, IV Decadron, IV Toradol, p.o. Valium and IV morphine and feeling much better.  Up and ambulatory.  Significant improvement.  She is neurovascular intact.  She has seen Dr. Jimenez in the past with some cervical issues.  Will give her on-call spine as well.  Keep her on Norco and Valium to finish off her prednisone taper over the next couple of days.  Ibuprofen as well.  Heating and stretching.  Outpatient follow-up.  Further workup offered considered and discussed.  No indication for emergent MRI.  She is agreeable likely discharge home, has a ride home, shared decision making utilized and return precaution provided      Patient was screened and evaluated during this visit.  As the treating physician attending to the patient, I determined within reasonable clinical confidence and prior to discharge, that an emergency medical condition was not or was no longer present.  There was no indication for further evaluation, treatment, or admission on an emergency basis.  Comprehensive verbal and written discharge and follow-up instructions were provided to help prevent relapse or worsening.  Patient was instructed to follow-up with their primary care provider for further evaluation and treatment, return immediately to ER for worsening, concerning, new, or changing/persisting symptoms. I discussed the case with the patient and they had no questions, complaints, or concerns.  Patient was comfortable going home.     Per the discharge paperwork, patients are encouraged to and given instructions on how to sign up for MyChart, where they have access to their records, including any/all incidental findings.     This note was prepared using Dragon Medical voice recognition dictation software. As a result errors may occur. When  identified these errors have been corrected. While every attempt is made to correct errors during dictation discrepancies may still exist    Note to patient: The 21st Century Cures Act makes medical notes like these available to patients in the interest of transparency. However, this is a medical document intended as peer to peer communication. It is written in medical language and may contain abbreviations or verbiage that are unfamiliar. It may appear blunt or direct. Medical documents are intended to carry relevant information, facts as evident, and the clinical opinion of the practitioner.                   Medical Decision Making      Disposition and Plan     Clinical Impression:  1. Lumbar radiculopathy         Disposition:  Discharge  10/8/2024 12:35 pm    Follow-up:  Dimitris Jimenez MD  [address] [facility] [address]  [facility] [id number] [phone]    Follow up      Jermaine Brewster MD  [address]  [facility] [address]  [phone]    Follow up  As needed          Medications Prescribed:  Discharge Medication List as of 10/8/2024 12:39 PM        START taking these medications    Details   HYDROcodone-acetaminophen 5-325 MG Oral Tab Take 1 tablet by mouth every 8 (eight) hours as needed for Pain., Normal, Disp-15 tablet, R-0      diazePAM 2 MG Oral Tab Take 1 tablet (2 mg total) by mouth 3 (three) times daily as needed (spasm)., Normal, Disp-15 tablet, R-0      !! ibuprofen 600 MG Oral Tab Take 1 tablet (600 mg total) by mouth every 8 (eight) hours as needed for Pain or Fever., Normal, Disp-30 tablet, R-0       !! - Potential duplicate medications found. Please discuss with provider.              Supplementary Documentation:

## 2024-10-08 NOTE — ED INITIAL ASSESSMENT (HPI)
Pt arrives to ED for evaluation of lower back pain, which started a week ago Sunday. Pt was prescribed steroids and Norco last Tuesday, which are not helping. Pt saw a chiropractor this morning, which did not help much, pt sent to ED for an MRI and pain management.

## 2024-10-10 ENCOUNTER — HOSPITAL ENCOUNTER (OUTPATIENT)
Dept: MRI IMAGING | Facility: HOSPITAL | Age: 44
Discharge: HOME | End: 2024-10-10
Payer: COMMERCIAL

## 2024-10-10 DIAGNOSIS — M54.50 LOWER BACK PAIN: ICD-10-CM

## 2024-10-10 DIAGNOSIS — M54.17 LUMBOSACRAL RADICULITIS: ICD-10-CM

## 2024-10-10 PROCEDURE — A9575 INJ GADOTERATE MEGLUMI 0.1ML: HCPCS

## 2024-10-10 PROCEDURE — 72158 MRI LUMBAR SPINE W/O & W/DYE: CPT

## 2024-10-10 RX ORDER — GADOTERATE MEGLUMINE 376.9 MG/ML
15 INJECTION INTRAVENOUS
Status: COMPLETED | OUTPATIENT
Start: 2024-10-10 | End: 2024-10-10

## 2024-10-10 RX ADMIN — GADOTERATE MEGLUMINE 12 ML: 376.9 INJECTION INTRAVENOUS at 20:34:00

## 2024-10-11 ENCOUNTER — TELEPHONE (OUTPATIENT)
Dept: ORTHOPEDICS CLINIC | Facility: CLINIC | Age: 44
End: 2024-10-11

## 2024-10-11 DIAGNOSIS — M54.50 LOW BACK PAIN, UNSPECIFIED BACK PAIN LATERALITY, UNSPECIFIED CHRONICITY, UNSPECIFIED WHETHER SCIATICA PRESENT: Primary | ICD-10-CM

## 2024-10-11 NOTE — TELEPHONE ENCOUNTER
Patient called for lower back pain. MRI in epic. Please advise if additional imaging needed   Future Appointments   Date Time Provider Department Center   10/21/2024  8:00 AM Dimitris Jimenez MD ENINAPER2 [facility]   10/21/2024 11:20 AM Jermaine Brewster MD [facility]   12/27/2024  8:15 AM Jeannette Bruno DO ENINAPER [facility]

## 2024-10-15 ENCOUNTER — TELEPHONE (OUTPATIENT)
Dept: SURGERY | Facility: CLINIC | Age: 44
End: 2024-10-15

## 2024-10-15 NOTE — TELEPHONE ENCOUNTER
LVMTCB for patient to reschedule apt on 10/21/2024 due to provider being out of office.   Sent a [employer] message to patient also.   Please assist patient when calling back to reschedule.

## 2024-10-18 ENCOUNTER — TELEPHONE (OUTPATIENT)
Dept: SURGERY | Facility: CLINIC | Age: 44
End: 2024-10-18

## 2024-10-18 NOTE — TELEPHONE ENCOUNTER
Reached out  to patient about her appointment on Monday with Dr. Jimenez. Patient was in  the ED on 10.8.2024 for Lumbar pain. Reached out to see if she made her appointment for a second opinion, Due to she has a morning appointment with Dr Brewster. Patient was not reachable couldn't leave voice mail.

## 2024-10-21 ENCOUNTER — HOSPITAL ENCOUNTER (OUTPATIENT)
Dept: GENERAL RADIOLOGY | Age: 44
Discharge: HOME | End: 2024-10-21
Attending: STUDENT IN AN ORGANIZED HEALTH CARE EDUCATION/TRAINING PROGRAM
Payer: COMMERCIAL

## 2024-10-21 ENCOUNTER — OFFICE VISIT (OUTPATIENT)
Dept: ORTHOPEDICS CLINIC | Facility: CLINIC | Age: 44
End: 2024-10-21
Payer: COMMERCIAL

## 2024-10-21 DIAGNOSIS — M54.50 LOW BACK PAIN, UNSPECIFIED BACK PAIN LATERALITY, UNSPECIFIED CHRONICITY, UNSPECIFIED WHETHER SCIATICA PRESENT: ICD-10-CM

## 2024-10-21 DIAGNOSIS — M51.26 LUMBAR DISC HERNIATION: Primary | ICD-10-CM

## 2024-10-21 PROCEDURE — 99205 OFFICE O/P NEW HI 60 MIN: CPT | Performed by: STUDENT IN AN ORGANIZED HEALTH CARE EDUCATION/TRAINING PROGRAM

## 2024-10-21 PROCEDURE — G2211 COMPLEX E/M VISIT ADD ON: HCPCS | Performed by: STUDENT IN AN ORGANIZED HEALTH CARE EDUCATION/TRAINING PROGRAM

## 2024-10-21 PROCEDURE — 72100 X-RAY EXAM L-S SPINE 2/3 VWS: CPT | Performed by: STUDENT IN AN ORGANIZED HEALTH CARE EDUCATION/TRAINING PROGRAM

## 2024-10-21 NOTE — TELEPHONE ENCOUNTER
Spoke with patient in regard to rescheduling/ canceling appt due to provider being out of office for surgery.     According to the patient, she was informed by Dr. Brewster that he would like her to see Dr. Jimenez as well.      Dr. Brewster plan of care on 10/21/2024:  PLAN:      We reviewed the patients history, symptoms, exam findings, and imaging today.  We had a detailed discussion outlining the etiology, anatomy, pathophysiology, and natural history of lumbar radiculopathy. The typical management of this condition may include lifestyle modification, NSAIDs, physical therapy, oral steroids, epidural injections, neuromodulatory medications, and sometimes pain medications.  Based on our discussion today we would like to have the patient initiate our recommendations for continued conservative therapy in the treatment of their condition noted in the assessment section.     - Referred patient to PT for lumbar radiculopathy  - Patient has already appointment for SAE     FOLLOW-UP:  We will see her back in follow-up in 6 weeks, or sooner if any problems arise. Patient understands and agrees with plan.     eJrmaine Brewster MD  Orthopedic Spine Surgeon  [facility]   [address] [place] [address], [address] [place] [address].org  [email or web address]  t: [phone]   f: [phone]

## 2024-10-21 NOTE — TELEPHONE ENCOUNTER
LVM to have patient inform office on what she is coming in to be seen for since she is also schedule with Dr. Brewster today 10/21/2024.

## 2024-10-21 NOTE — TELEPHONE ENCOUNTER
PSR spoke to patient in regard to appt on 10/21/2024 at 3:20 pm with Dr. Jimenez. Informed patient that since she is seeing Dr. Brewster this morning that it is not common for both providers to see a patent.     Patient stated that ER wanted her to see both Dr. Jimenez and Dr. Brewster due to her case being complicated.     Clincial staff informed PSR that they will consult Dr. Jimenez today when he arrives to clinic to see if he will see patient still.     PSR informed patient of this information and she confirmed understanding. Patient was informed that she should inform Dr. Brewster that she may see Dr. Jimenez today still, so Dr. Brewster is aware.     PSR let patient know someone will contact her this afternoon to let her know about appt.

## 2024-10-21 NOTE — H&P
[facility] - ORTHOPEDICS  [address], [address] [address]  [address] [place] [address]  [phone]     ORTHOPEDIC SPINE CONSULTATION    Name: Morelia Camarillo   MRN: [id number]  Date: 10/21/24      CC: lumbar disc herniation and cervical myelopathy    REFERRED BY: Felecia Sanchez MD    HPI:   Morelia Camarillo is a very pleasant 44 year old female who presents today for evaluation of low back pain radiating down left lower extremity.  Symptoms started approximately 3 weeks ago and patient was treated in the emergency department due to lack of response to steroids.  Patient has since followed up with outside spine surgeon and has had MRI.  Symptoms are gradually improving.  Patient still has pain and numbness radiating into left anterior thigh    Patient also describes known myelopathic symptoms, including gait disturbances as well as trouble with dexterity.  Symptoms have been present for several years, patient has had significant workup and has seen multiple neurosurgeons in the past.    Prior spine surgery: none.    Bowel and bladder symptoms: absent.    The patient has had issues with balance and/or hand dexterity problems such as changes in penmanship or the use of buttons or zippers.    Treatment up to this time has included:    Evaluation: PCP and other spine surgeon  NSAIDS: have worked well  Narcotic use: None  Physical therapy: None  Spinal injections: None  Others: None    PMH:   Past Medical History:    Anxiety    Arthritis    Unspecified autoimmune arthritis    Asthma (HCC)    Mild    Connective tissue disease (HCC)    Esophageal reflux    Improved since beginning Leflunomide    Fibromyalgia    IBS (irritable bowel syndrome)    Osteoarthritis    Recent finding of cervical spinal cord compression       PAST SURGICAL HX:  Past Surgical History:   Procedure Laterality Date             FAMILY HX:  Family History   Problem Relation Age of  Onset    Gastrointestinal Cancer Father         esophageal    Depression Mother     Hyperlipidemia Mother     Heart Disorder Maternal Grandfather     Diabetes Paternal Grandmother     Dementia Paternal Grandmother     Ovarian Cancer Paternal Aunt         50's       ALLERGIES:  Patient has no known allergies.    MEDICATIONS:   Current Outpatient Medications   Medication Sig Dispense Refill    cyanocobalamin 1000 MCG Oral Tab Take 1 tablet (1,000 mcg total) by mouth daily.      albuterol 108 (90 Base) MCG/ACT Inhalation Aero Soln Inhale 2 puffs into the lungs every 4 to 6 hours. 18 g 1    predniSONE 5 MG Oral Tab Take 1 tablet (5 mg total) by mouth daily.      predniSONE 1 MG Oral Tab Take 4 tablets (4 mg total) by mouth daily. (Patient not taking: Reported on 9/27/2024)      ibuprofen (ADVIL) 200 MG Oral Tab Take 4 tablets (800 mg total) by mouth as needed for Pain.      leflunomide 20 MG Oral Tab Take 1 tablet (20 mg total) by mouth daily.      BuPROPion HCl ER, XL, 150 MG Oral Tablet 24 Hr 1 tablet (150 mg total)  in the morning and 1 tablet (150 mg total) before bedtime.  0    alprazolam 0.25 MG Oral Tab Take 1 tablet (0.25 mg total) by mouth nightly as needed for Sleep.         ROS: A comprehensive 14 point review of systems was performed and was negative aside from the aforementioned per history of present illness.    SOCIAL HX:  Social History     Tobacco Use    Smoking status: Never     Passive exposure: Never    Smokeless tobacco: Never   Substance Use Topics    Alcohol use: Not Currently         PE:   There were no vitals filed for this visit.  Estimated body mass index is 24.69 kg/m² as calculated from the following:    Height as of 10/8/24: 5' 2\" (1.575 m).    Weight as of 10/8/24: 135 lb (61.2 kg).    Physical Exam  Constitutional:       Appearance: Normal appearance.   HENT:      Head: Normocephalic and atraumatic.   Eyes:      Extraocular Movements: Extraocular movements intact.   Cardiovascular:       Pulses: Normal pulses. Skin warm and well perfused.  Pulmonary:      Effort: Pulmonary effort is normal. No respiratory distress.   Skin:     General: Skin is warm.   Psychiatric:         Mood and Affect: Mood normal.     Spine Exam:    Normal gait without difficulty  Unable to heel, toe, tandem gait without difficulty  Level shoulders and hips in even stance    Normal C-spine ROM  Normal L-spine ROM    No tenderness to palpation of C-spine  No tenderness to palpation of T-spine  No tenderness to palpation of L-spine    Spluring: negative  Straight leg raise test: negative    Hanson's: negative  IRR: negative  Sustained clonus: negative     and release: normal  Rhomberg: normal    UE Strength: 5/5 D Bi Tri WE FF FA  UE Sensation: normal in C1-T1 distribution  UE reflexes: normal    LE Strength: 5/5 IP Quad TA EHL GSC  LE Sensation: normal in L2-S1 distribution  LE reflexes: normal      Radiographic Examination/Diagnostics:  XR and MRI personally viewed, independently interpreted and radiology report was reviewed.  X-ray of the lumbar spine demonstrates mild degenerative changes  MRI of the lumbar spine demonstrates foraminal disc herniation on the left at L3-4    IMPRESSION: Morelia Camarillo is a 44 year old female with cervical myelopathy as well as lumbar radiculopathy.  With regard to patient's lumbar radiculopathy, there is good chance that symptoms will improve with time with physical therapy as well as possible epidural steroid injection.  Physical therapy provided.    Patient also wanted to discuss her cervical myelopathy symptoms.  Patient has had extensive workup.  Patient does not have any elma upper motor neuron signs on exam today, but her symptoms are consistent with cervical myelopathy with MRI demonstrating broad disc protrusion at C5-6 and C6-C7.  I discussed with patient natural history of cervical myelopathy as well as goals of surgery.  There is increasing evidence for early intervention for  preservation of neurologic function. Patient may benefit from C5-7 cervical disc replacement.     PLAN:     We reviewed the patients history, symptoms, exam findings, and imaging today.  We had a detailed discussion outlining the etiology, anatomy, pathophysiology, and natural history of lumbar radiculopathy. The typical management of this condition may include lifestyle modification, NSAIDs, physical therapy, oral steroids, epidural injections, neuromodulatory medications, and sometimes pain medications.  Based on our discussion today we would like to have the patient initiate our recommendations for continued conservative therapy in the treatment of their condition noted in the assessment section.     - Referred patient to PT for lumbar radiculopathy  - Patient has already appointment for SAE    FOLLOW-UP:  We will see her back in follow-up in 6 weeks, or sooner if any problems arise. Patient understands and agrees with plan.    Jermaine Brewster MD  Orthopedic Spine Surgeon  [facility]   [address] [place] [address].org  [email or web address]  t: [phone]   f: [phone]        This note was dictated using Dragon software.  While it was briefly proofread prior to completion, some grammatical, spelling, and word choice errors due to dictation may still occur.

## 2024-10-29 ENCOUNTER — OFFICE VISIT (OUTPATIENT)
Dept: SURGERY | Facility: CLINIC | Age: 44
End: 2024-10-29
Payer: COMMERCIAL

## 2024-10-29 VITALS
WEIGHT: 135 LBS | SYSTOLIC BLOOD PRESSURE: 130 MMHG | HEART RATE: 90 BPM | HEIGHT: 62 IN | BODY MASS INDEX: 24.84 KG/M2 | DIASTOLIC BLOOD PRESSURE: 72 MMHG

## 2024-10-29 DIAGNOSIS — G99.2 MYELOPATHY CONCURRENT WITH AND DUE TO SPINAL STENOSIS OF CERVICAL REGION (HCC): Primary | ICD-10-CM

## 2024-10-29 DIAGNOSIS — M48.02 MYELOPATHY CONCURRENT WITH AND DUE TO SPINAL STENOSIS OF CERVICAL REGION (HCC): Primary | ICD-10-CM

## 2024-10-29 PROBLEM — R53.1 RIGHT SIDED WEAKNESS: Status: ACTIVE | Noted: 2022-03-28

## 2024-10-29 PROBLEM — M51.379 DEGENERATION OF INTERVERTEBRAL DISC OF LUMBOSACRAL REGION: Status: ACTIVE | Noted: 2024-10-17

## 2024-10-29 PROBLEM — M48.061 LUMBAR FORAMINAL STENOSIS: Status: ACTIVE | Noted: 2024-10-17

## 2024-10-29 PROBLEM — M51.26 LUMBAR HERNIATED DISC: Status: ACTIVE | Noted: 2024-10-17

## 2024-10-29 PROCEDURE — 3075F SYST BP GE 130 - 139MM HG: CPT | Performed by: NEUROLOGICAL SURGERY

## 2024-10-29 PROCEDURE — 99214 OFFICE O/P EST MOD 30 MIN: CPT | Performed by: NEUROLOGICAL SURGERY

## 2024-10-29 PROCEDURE — 3008F BODY MASS INDEX DOCD: CPT | Performed by: NEUROLOGICAL SURGERY

## 2024-10-29 PROCEDURE — 3078F DIAST BP <80 MM HG: CPT | Performed by: NEUROLOGICAL SURGERY

## 2024-10-29 NOTE — PROGRESS NOTES
Established patient:  Reason for follow up: lower back pain          Numeric Rating Scale:      Pain at Present:  3/10       Distribution of Pain:    left  states she had N/T in her left leg     Most recent treatments for Current Pain Condition:     No passed PT   Injection on 10.28.24- 30%   No passed SX      Response to treatment: some relief    New imaging or testing since your last office visit:      X_Ray -lower back   MRI lower back pain

## 2024-10-29 NOTE — PATIENT INSTRUCTIONS
Refill policies:    Allow 2-3 business days for refills; controlled substances may take longer.  Contact your pharmacy at least 5 days prior to running out of medication and have them send an electronic request or submit request through the “request refill” option in your makeena account.  Refills are not addressed on weekends; covering physicians do not authorize routine medications on weekends.  No narcotics or controlled substances are refilled after noon on Fridays or by on call physicians.  By law, narcotics must be electronically prescribed.  A 30 day supply with no refills is the maximum allowed.  If your prescription is due for a refill, you may be due for a follow up appointment.  To best provide you care, patients receiving routine medications need to be seen at least once a year.  Patients receiving narcotic/controlled substance medications need to be seen at least once every 3 months.  In the event that your preferred pharmacy does not have the requested medication in stock (e.g. Backordered), it is your responsibility to find another pharmacy that has the requested medication available.  We will gladly send a new prescription to that pharmacy at your request.    Scheduling Tests:    If your physician has ordered radiology tests such as MRI or CT scans, please contact Central Scheduling at [phone] right away to schedule the test.  Once scheduled, the [facility] Centralized Referral Team will work with your insurance carrier to obtain pre-certification or prior authorization.  Depending on your insurance carrier, approval may take 3-10 days.  It is highly recommended patients assure they have received an authorization before having a test performed.  If test is done without insurance authorization, patient may be responsible for the entire amount billed.      Precertification and Prior Authorizations:  If your physician has recommended that you have a procedure or additional testing performed the [facility]  Centralized Referral Team will contact your insurance carrier to obtain pre-certification or prior authorization.    You are strongly encouraged to contact your insurance carrier to verify that your procedure/test has been approved and is a COVERED benefit.  Although the [facility] Centralized Referral Team does its due diligence, the insurance carrier gives the disclaimer that \"Although the procedure is authorized, this does not guarantee payment.\"    Ultimately the patient is responsible for payment.   Thank you for your understanding in this matter.  Paperwork Completion:  If you require FMLA or disability paperwork for your recovery, please make sure to either drop it off or have it faxed to our office at [phone]. Be sure the form has your name and date of birth on it.  The form will be faxed to our Forms Department and they will complete it for you.  There is a 25$ fee for all forms that need to be filled out.  Please be aware there is a 10-14 day turnaround time.  You will need to sign a release of information (GOMEZ) form if your paperwork does not come with one.  You may call the Forms Department with any questions at [phone].  Their fax number is [phone].

## 2024-10-29 NOTE — PROGRESS NOTES
Neurosurgery Clinic Visit  10/29/2024    Morelia Camarillo PCP:  Felecia Sanchez MD    1980 MRN [id number]     HISTORY OF PRESENT ILLNESS:  Morelia Camarillo is a(n) 44 year old female who presents today in office follow-up.  I actually saw the patient back on , for suspected mild cervical myelopathy.  Workup was prescribed, but the patient has not been seen since then.    More recently, she sustained a lumbar disc herniation.  She had an excruciating radiculopathy.  She underwent an epidural steroid injection yesterday.  She reports about 30% additional relief from this, in addition to some ongoing underlying improvement.  At this point, her pain grades about 1/10.    She continues to endorse symptoms similar to what she reported in the past, including some fine motor difficulties in her hands.    She has been evaluated by the National diagnosis program in [facility].  There is suspicion for genetic IL-1 family disorder.  Further genetic testing is pending.    She continues to work as a /therapist.      PHYSICAL EXAMINATION:  Vital Signs:  /72 (BP Location: Right arm, Patient Position: Sitting, Cuff Size: adult)   Pulse 90   Ht 62\"   Wt 135 lb (61.2 kg)   LMP 2024 (Approximate)   BMI 24.69 kg/m²   On examination, strength remains 5/5 throughout.  Reflexes are 2+ and symmetric.  No Steven's or clonus.      REVIEW OF STUDIES:    New lumbar MRI was reviewed.  This demonstrates a left-sided far lateral disc herniation with compression of the L3 nerve root.    No new cervical imaging is available.      ASSESSMENT and PLAN:  1.  Lumbar far lateral disc herniation with radiculopathy.  Her symptoms seem to be improving.  I explained the operation for this, if needed, would entail a minimally invasive far lateral discectomy.  However, at this point, her symptoms have improved considerably, and no further intervention is indicated.    2.  Mild cervical myelopathy.  As  before, this is a complex situation, with likely several clinical entities present some overlap.  However, the patient does have some concerning findings on the cervical MRI scan.    I would like to obtain a new cervical MRI scan, as the present study is now greater than 6 months old.    I have also encouraged the patient to undergo the video swallow study.  She will schedule this.  This is being requested because she reports some dysphagia, which could potentially be exacerbated after an anterior cervical operation.    I would like to see her back in about 2 weeks, or as soon as the MRI and video swallow are done, to carry on our discussion.    Lumbar radiculopathy worsens at all, I would be happy to see her back sooner.        Time spent on counseling/coordination of care:  30 Minutes    Total time spent with patient:  15 Minutes        10/29/2024  10:37 AM     This report was dictated using voice recognition technology.  Errors in syntax or recognition may occur, and should not be construed to change the meaning of a sentence.

## 2024-11-21 ENCOUNTER — TELEPHONE (OUTPATIENT)
Dept: SURGERY | Facility: CLINIC | Age: 44
End: 2024-11-21

## 2024-11-21 NOTE — TELEPHONE ENCOUNTER
LVMTCB for patient to reschedule apt on 12/02/2024 due to provider being out of office.  Sent a Everfi message to patient also.  Please assist patient when calling back to reschedule.

## 2024-11-22 ENCOUNTER — HOSPITAL ENCOUNTER (OUTPATIENT)
Dept: GENERAL RADIOLOGY | Facility: HOSPITAL | Age: 44
Discharge: HOME | End: 2024-11-22
Attending: PHYSICIAN ASSISTANT
Payer: COMMERCIAL

## 2024-11-22 DIAGNOSIS — R13.10 DYSPHAGIA, UNSPECIFIED TYPE: ICD-10-CM

## 2024-11-22 PROCEDURE — 74230 X-RAY XM SWLNG FUNCJ C+: CPT | Performed by: PHYSICIAN ASSISTANT

## 2024-11-22 PROCEDURE — 92611 MOTION FLUOROSCOPY/SWALLOW: CPT

## 2024-11-22 NOTE — PROGRESS NOTES
ADULT VIDEOFLUOROSCOPIC SWALLOWING STUDY    Admission Date: 11/22/2024  Evaluation Date: 11/22/24  Radiologist: Dr. Peralta    RECOMMENDATIONS   Diet Recommendations - Solids: Regular  Diet Recommendations - Liquids: Thin Liquids    Further Follow-up:  Follow Up Needed (Documentation Required): No           Treatment Plan/Recommendations:  (No further skilled SLP services warranted)    HISTORY   Background/Objective Information:    Problem List  Active Problems:    * No active hospital problems. *      Past Medical History  Past Medical History:    Anxiety    Arthritis    Unspecified autoimmune arthritis    Asthma (HCC)    Mild    Connective tissue disease (HCC)    Esophageal reflux    Improved since beginning Leflunomide    Fibromyalgia    IBS (irritable bowel syndrome)    Osteoarthritis    Recent finding of cervical spinal cord compression       Current Diet Consistency: Regular;Thin liquids  Prior Level of Function: Independent  Prior Living Situation: Home with support  History of Recent: No recent respiratory difficulty  Precautions: None  Imaging results:     Reason for Referral: R/O aspiration    Family/Patient Goals:  To determine cause for coughing/choking episode     ASSESSMENT   DYSPHAGIA ASSESSMENT  Test completed in conjunction with Radiologist.  Patient Positioned: Upright;Midline;Standard Chair.  Patient Viewed: Lateral.   .  Consistencies Presented: Thin liquids;Puree;Hard solid to assess oropharyngeal swallow function and assess for compensatory strategies to improve safe swallow function.    THIN LIQUIDS  Method of Presentation: Teaspoon;Cup;Straw  Oral Phase of Swallow (VFSS - Thin Liquids): Within Functional Limits  Triggered at: Aryepiglottic folds  Laryngeal Penetration: None  Tracheal Aspiration: None        PUREE  Oral Phase of Swallow (VFSS - Puree): Within Functional Limits  Triggered at: Valleculae  Laryngeal Penetration: None  Tracheal Aspiration: None     HARD SOLID  Oral Phase of Swallow  (VFSS - Hard Solid): Within Functional Limits  Triggered at: Valleculae  Laryngeal Penetration: None  Tracheal Aspiration: None  Penetration Aspiration Scale Score: Score 1: Material does not enter airway       Overall Impression:   Videofluoroscopic swallow study was completed in conjunction with the radiologist.  Patient was assessed with thin, puree and solid consistencies via spoon, cup, and straw. Patient demonstrated a functional oral phase and pharyngeal phase without any laryngeal penetration or aspiration during the controlled conditions of this exam.    Oral phase revealed cohesive bolus hold, timely and efficient mastication, brisk tongue motion, and complete oral clearing.   Pharyngeal phase revealed complete superior movement of thyroid cartilage, complete anterior hyoid excursion, complete epiglottic inversion and recoil, intact laryngeal closure at the height of the swallow, complete distension and duration of pharyngoesophageal segment opening and intact tongue base retraction.  Patient demonstrates safe and efficient po intake for regular solids and thin liquids as evidenced by functional oral and pharyngeal phase without any laryngeal penetration or aspiration.         PLAN: No further follow up as patient demonstrates safe and efficient po intake for regular solids and thin liquids.     EDUCATION/INSTRUCTION  Reviewed results and recommendations with patient.  Agreement/Understanding verbalized and all questions answered to her apparent satisfaction.    INTERDISCIPLINARY COMMUNICATION  Reviewed results with Radiologist; agreement verbalized.      FOLLOW UP  Treatment Plan/Recommendations: No further skilled SLP services warranted       Thank you for your referral.   If you have any questions, please contact LIYAH Kim

## 2024-11-26 ENCOUNTER — TELEPHONE (OUTPATIENT)
Dept: SURGERY | Facility: CLINIC | Age: 44
End: 2024-11-26

## 2024-11-26 ENCOUNTER — HOSPITAL ENCOUNTER (OUTPATIENT)
Dept: MRI IMAGING | Facility: HOSPITAL | Age: 44
Discharge: HOME | End: 2024-11-26
Attending: NEUROLOGICAL SURGERY
Payer: COMMERCIAL

## 2024-11-26 DIAGNOSIS — G99.2 MYELOPATHY CONCURRENT WITH AND DUE TO SPINAL STENOSIS OF CERVICAL REGION (HCC): ICD-10-CM

## 2024-11-26 DIAGNOSIS — M48.02 MYELOPATHY CONCURRENT WITH AND DUE TO SPINAL STENOSIS OF CERVICAL REGION (HCC): ICD-10-CM

## 2024-11-26 PROCEDURE — 72141 MRI NECK SPINE W/O DYE: CPT | Performed by: NEUROLOGICAL SURGERY

## 2024-11-26 NOTE — TELEPHONE ENCOUNTER
MRI cervical spine is scheduled for today at 7 PM.  Cervical spine ordered by Dr. Jimenez is CPT code 87103 cervical spine without contrast.      Discussed with AVERY Call.  Current order without contrast is appropriate as we are not currently looking for infection or tumor.  Called and informed patient of this.  Ointment for 7 PM should be kept as is.  Patient acknowledged and was very thankful for the call.

## 2024-11-26 NOTE — TELEPHONE ENCOUNTER
Patient would like to know if MRI order needs to be with contrast also, she will be having the MRI today 11/26/2024. Please advise

## 2024-12-03 ENCOUNTER — OFFICE VISIT (OUTPATIENT)
Dept: SURGERY | Facility: CLINIC | Age: 44
End: 2024-12-03
Payer: COMMERCIAL

## 2024-12-03 VITALS
SYSTOLIC BLOOD PRESSURE: 124 MMHG | HEART RATE: 105 BPM | HEIGHT: 62 IN | BODY MASS INDEX: 24.84 KG/M2 | WEIGHT: 135 LBS | DIASTOLIC BLOOD PRESSURE: 72 MMHG

## 2024-12-03 DIAGNOSIS — M48.02 MYELOPATHY CONCURRENT WITH AND DUE TO SPINAL STENOSIS OF CERVICAL REGION (HCC): Primary | ICD-10-CM

## 2024-12-03 DIAGNOSIS — G99.2 MYELOPATHY CONCURRENT WITH AND DUE TO SPINAL STENOSIS OF CERVICAL REGION (HCC): Primary | ICD-10-CM

## 2024-12-03 PROCEDURE — 99213 OFFICE O/P EST LOW 20 MIN: CPT | Performed by: NEUROLOGICAL SURGERY

## 2024-12-03 PROCEDURE — 3008F BODY MASS INDEX DOCD: CPT | Performed by: NEUROLOGICAL SURGERY

## 2024-12-03 PROCEDURE — 3078F DIAST BP <80 MM HG: CPT | Performed by: NEUROLOGICAL SURGERY

## 2024-12-03 PROCEDURE — 3074F SYST BP LT 130 MM HG: CPT | Performed by: NEUROLOGICAL SURGERY

## 2024-12-03 RX ORDER — DIAZEPAM 2 MG/1
2 TABLET ORAL EVERY 6 HOURS PRN
COMMUNITY
Start: 2024-11-26

## 2024-12-03 NOTE — PROGRESS NOTES
Neurosurgery Clinic Visit  12/3/2024    Morelia Camarillo PCP:  Felecia Sanchez MD    1980 MRN [id number]     HISTORY OF PRESENT ILLNESS:  Morelia Camarillo is a(n) 44 year old female who presents today in office follow-up.  She has previously been seen for symptoms consistent with mild cervical myelopathy, as well as lumbar radiculopathy.    She reports the lumbar radiculopathy continues to improve with time and physical therapy.    She also believes the cervical symptoms, including fine motor difficulties, are improving over time.      PHYSICAL EXAMINATION:  Vital Signs:  /72   Pulse 105   Ht 62\"   Wt 135 lb (61.2 kg)   LMP 2024 (Approximate)   BMI 24.69 kg/m²   On examination, strength is 5/5.  Reflexes are 1+ and symmetric.  No Steven's or clonus.      REVIEW OF STUDIES:    New MRI of the cervical spine was performed on .  Images personally reviewed.  This is essentially stable from the March study.  There are disc protrusions at C5-6 and C6-7 resulting in effacement of the ventral subarachnoid space, as well as some mild contact of the ventral aspect of the cord.  No signal change in the cord.    Swallow study results were reviewed.  This is within normal limits.      ASSESSMENT and PLAN:  1.  Mild cervical spondylotic myelopathy    2.  Lumbar radiculopathy    Overall, the patient's symptoms are improving.  Based on her clinical progress, I do not recommend any surgical intervention.  I would like to keep an eye on her progress with regards to the cervical spine.  Specifically, she is at risk for development of further symptoms of myelopathy.    She will follow-up with me in 6 months.  No new imaging is required prior to that visit.  If she develops any new or worsening symptoms in the meantime, she will follow-up sooner.        Time spent on counseling/coordination of care:  15 Minutes    Total time spent with patient:  15 Minutes        12/3/2024  10:12 AM     This report  was dictated using voice recognition technology.  Errors in syntax or recognition may occur, and should not be construed to change the meaning of a sentence.

## 2024-12-03 NOTE — PATIENT INSTRUCTIONS
Refill policies:    Allow 2-3 business days for refills; controlled substances may take longer.  Contact your pharmacy at least 5 days prior to running out of medication and have them send an electronic request or submit request through the “request refill” option in your [employer] account.  Refills are not addressed on weekends; covering physicians do not authorize routine medications on weekends.  No narcotics or controlled substances are refilled after noon on Fridays or by on call physicians.  By law, narcotics must be electronically prescribed.  A 30 day supply with no refills is the maximum allowed.  If your prescription is due for a refill, you may be due for a follow up appointment.  To best provide you care, patients receiving routine medications need to be seen at least once a year.  Patients receiving narcotic/controlled substance medications need to be seen at least once every 3 months.  In the event that your preferred pharmacy does not have the requested medication in stock (e.g. Backordered), it is your responsibility to find another pharmacy that has the requested medication available.  We will gladly send a new prescription to that pharmacy at your request.    Scheduling Tests:    If your physician has ordered radiology tests such as MRI or CT scans, please contact Central Scheduling at [phone] right away to schedule the test.  Once scheduled, the [facility] Centralized Referral Team will work with your insurance carrier to obtain pre-certification or prior authorization.  Depending on your insurance carrier, approval may take 3-10 days.  It is highly recommended patients assure they have received an authorization before having a test performed.  If test is done without insurance authorization, patient may be responsible for the entire amount billed.      Precertification and Prior Authorizations:  If your physician has recommended that you have a procedure or additional testing performed the [facility]  Centralized Referral Team will contact your insurance carrier to obtain pre-certification or prior authorization.    You are strongly encouraged to contact your insurance carrier to verify that your procedure/test has been approved and is a COVERED benefit.  Although the [facility] Centralized Referral Team does its due diligence, the insurance carrier gives the disclaimer that \"Although the procedure is authorized, this does not guarantee payment.\"    Ultimately the patient is responsible for payment.   Thank you for your understanding in this matter.  Paperwork Completion:  If you require FMLA or disability paperwork for your recovery, please make sure to either drop it off or have it faxed to our office at [phone]. Be sure the form has your name and date of birth on it.  The form will be faxed to our Forms Department and they will complete it for you.  There is a 25$ fee for all forms that need to be filled out.  Please be aware there is a 10-14 day turnaround time.  You will need to sign a release of information (GOMEZ) form if your paperwork does not come with one.  You may call the Forms Department with any questions at [phone].  Their fax number is [phone].

## 2024-12-03 NOTE — PROGRESS NOTES
Patient with Cervical myelopathy is here today for  f/u after Cervical MRI and Video Swallow Study     - 11/26/2024- MRI Spine Cervical    - 11/22/2024- Video swallow study

## 2024-12-27 ENCOUNTER — TELEPHONE (OUTPATIENT)
Dept: NEUROLOGY | Facility: CLINIC | Age: 44
End: 2024-12-27

## 2024-12-27 ENCOUNTER — OFFICE VISIT (OUTPATIENT)
Dept: NEUROLOGY | Facility: CLINIC | Age: 44
End: 2024-12-27
Payer: COMMERCIAL

## 2024-12-27 VITALS
HEART RATE: 90 BPM | WEIGHT: 144 LBS | BODY MASS INDEX: 26 KG/M2 | SYSTOLIC BLOOD PRESSURE: 128 MMHG | RESPIRATION RATE: 16 BRPM | DIASTOLIC BLOOD PRESSURE: 74 MMHG

## 2024-12-27 DIAGNOSIS — R20.2 PARESTHESIAS: ICD-10-CM

## 2024-12-27 DIAGNOSIS — M51.26 LUMBAR HERNIATED DISC: ICD-10-CM

## 2024-12-27 DIAGNOSIS — R44.8 SENSORY IMPAIRMENT: ICD-10-CM

## 2024-12-27 DIAGNOSIS — G62.9 SMALL FIBER NEUROPATHY: Primary | ICD-10-CM

## 2024-12-27 DIAGNOSIS — R29.898 RIGHT LEG WEAKNESS: ICD-10-CM

## 2024-12-27 DIAGNOSIS — G95.9 CERVICAL MYELOPATHY (HCC): ICD-10-CM

## 2024-12-27 PROCEDURE — 3074F SYST BP LT 130 MM HG: CPT | Performed by: STUDENT IN AN ORGANIZED HEALTH CARE EDUCATION/TRAINING PROGRAM

## 2024-12-27 PROCEDURE — 99214 OFFICE O/P EST MOD 30 MIN: CPT | Performed by: STUDENT IN AN ORGANIZED HEALTH CARE EDUCATION/TRAINING PROGRAM

## 2024-12-27 PROCEDURE — 3078F DIAST BP <80 MM HG: CPT | Performed by: STUDENT IN AN ORGANIZED HEALTH CARE EDUCATION/TRAINING PROGRAM

## 2024-12-27 NOTE — PATIENT INSTRUCTIONS
-Referral to Dr. Pao Slade [facility] Peripheral Neurology, nursing will call you when referral faxed  -Continue seeing genetics: advise whole genome sequencing and  genetic panel for cyclical fevers  -[employer] /antwon Kidds specialist  -Continue following with neurosurgery, rheumatologyt  -Follow up with Dr Castillo 3 months      Refill policies:    Allow 2-3 business days for refills; controlled substances may take longer.  Contact your pharmacy at least 5 days prior to running out of medication and have them send an electronic request or submit request through the “request refill” option in your [employer] account.  Refills are not addressed on weekends; covering physicians do not authorize routine medications on weekends.  No narcotics or controlled substances are refilled after noon on Fridays or by on call physicians.  By law, narcotics must be electronically prescribed.  A 30 day supply with no refills is the maximum allowed.  If your prescription is due for a refill, you may be due for a follow up appointment.  To best provide you care, patients receiving routine medications need to be seen at least once a year.  Patients receiving narcotic/controlled substance medications need to be seen at least once every 3 months.  In the event that your preferred pharmacy does not have the requested medication in stock (e.g. Backordered), it is your responsibility to find another pharmacy that has the requested medication available.  We will gladly send a new prescription to that pharmacy at your request.    Scheduling Tests:    If your physician has ordered radiology tests such as MRI or CT scans, please contact Central Scheduling at [phone] right away to schedule the test.  Once scheduled, the [facility] Centralized Referral Team will work with your insurance carrier to obtain pre-certification or prior authorization.  Depending on your insurance carrier, approval may take 3-10 days.  It is  highly recommended patients assure they have received an authorization before having a test performed.  If test is done without insurance authorization, patient may be responsible for the entire amount billed.      Precertification and Prior Authorizations:  If your physician has recommended that you have a procedure or additional testing performed the [facility] Centralized Referral Team will contact your insurance carrier to obtain pre-certification or prior authorization.    You are strongly encouraged to contact your insurance carrier to verify that your procedure/test has been approved and is a COVERED benefit.  Although the [facility] Centralized Referral Team does its due diligence, the insurance carrier gives the disclaimer that \"Although the procedure is authorized, this does not guarantee payment.\"    Ultimately the patient is responsible for payment.   Thank you for your understanding in this matter.  Paperwork Completion:  If you require FMLA or disability paperwork for your recovery, please make sure to either drop it off or have it faxed to our office at [phone]. Be sure the form has your name and date of birth on it.  The form will be faxed to our Forms Department and they will complete it for you.  There is a 25$ fee for all forms that need to be filled out.  Please be aware there is a 10-14 day turnaround time.  You will need to sign a release of information (GOMEZ) form if your paperwork does not come with one.  You may call the Forms Department with any questions at [phone].  Their fax number is [phone].

## 2024-12-27 NOTE — TELEPHONE ENCOUNTER
Nursing, please fax my referral to Dr Pao Slade's clinic at [facility] neurology and update Morelia when done.   Thanks! Jeannette

## 2024-12-27 NOTE — PROGRESS NOTES
Neurology outpt follow up        Interval history December 27, 2024 :   Found to have lumbar disc, some radiating pain down left leg, therapy and injections have helped a lot. Still dealing with symptoms on right side not as bad. Saw , who ordered neuropathy panel and mitochondrial panel, advised she see a physician at [facility] re consideration of oredering whole genome sequencing per pt. Remains on leflunomide same dose.       Neur review of systems:  -Cognition: more word finding troubles over months, has had a couple instances where could not bring words (could not find words, words on tip of tongue), has had a couple times where really got stuck lasting a couple minutes, no other associated symptoms that were more pronounced  -Fatigue: yes  -Pain: sharp pains right side of face, thought it was teeth (dental workup unrevealing), pain consistent with some zapping, could trigger with palpation to certain area, lasted for a couple weeks 11/2023, self resolved, neck pain  -Depression/anxiety: no big changes, some anxiety in setting of unknown  -Vision: she reports floaters periodically when in shower confident in both eyes, getting out of shower. Floaters improved with getting out of shower and cooling off (some associated lightheadedness). Intermittently will see something in peripheral vision not moving (no confusion, bowel/bladder loss of control). Usually happens when standing. Eye doctor doesn't see any proteins floating around per patient.   -Dysphagia/dysarthria: intermittent trouble with solids/liquids similar (better on lefluonomide 20 mg, no major aspiration issues since 1/2023), couple episodes of reflux in last month  -GI: early satiety, acid reflux without clear provocation  -Motor: right arm/face/leg weakness, previously better on leflunomide, worse at last visit/better now.   -Sensory: right arm/face/leg numbness and tingling getting worse overall, rare tingling on left side of foot but  not as pronounced. Right arm tremor improved on leflunomide but still slightly evident with postural hold   -Ambulation: sometimes feels like needs a cane, some close calls with walking. Last 2023 fell in January- legs gave out (getting out of car, no premonitory signs, no passing out, gets right back up and back to normal immediately, two similar episodes in the fall), when feeling weak feels a little wobbly   -Bowel: no bleeding in awhile, feels body has adjusted to leflunomide (previously some loose stools in AM)  -Bladder: thinks she feels fully empties bladder, no recent infections, no urinary frequency  -Lhermittes: some localized shoulder pito  -Tobacco: no  -Exercise: stretches almost daily, hasn't done a whole lot more than that as seems to aggravate symptoms    Leflunomide last incresaed 2023. Started 10/2022 didn't work [facility].     PAST MEDICAL HISTORY:  Past Medical History:    Anxiety    Arthritis    Unspecified autoimmune arthritis    Asthma (HCC)    Mild    Connective tissue disease (HCC)    Esophageal reflux    Improved since beginning Leflunomide    Fibromyalgia    IBS (irritable bowel syndrome)    Osteoarthritis    Recent finding of cervical spinal cord compression       PAST SURGICAL HISTORY:  Past Surgical History:   Procedure Laterality Date             FAMILY HISTORY:  family history includes Dementia in her paternal grandmother; Depression in her mother; Diabetes in her paternal grandmother; Gastrointestinal Cancer in her father; Heart Disorder in her maternal grandfather; Hyperlipidemia in her mother; Ovarian Cancer in her paternal aunt.  FMHx: Paternal first cousin struggling for 15 years with similar course, diagnosed with psoriatic arthritis, also with paternal uncle with some sort of autoimmune dysfunction. Paternal uncle had stroke in 50s in setting of psoriatic arthritis. Another paternal cousin with psoriasis    SOCIAL HISTORY:   reports that she has never smoked.  She has never been exposed to tobacco smoke. She has never used smokeless tobacco. She reports that she does not currently use alcohol. She reports that she does not use drugs. Works in social work.     ALLERGIES:  No Known Allergies    MEDICATIONS:  Prior to Admission Medications   Medication Sig    diazePAM 2 MG Oral Tab Take 1 tablet (2 mg total) by mouth every 6 (six) hours as needed for Anxiety.    cyanocobalamin 1000 MCG Oral Tab Take 1 tablet (1,000 mcg total) by mouth daily.    albuterol 108 (90 Base) MCG/ACT Inhalation Aero Soln Inhale 2 puffs into the lungs every 4 to 6 hours.    predniSONE 5 MG Oral Tab Take 1 tablet (5 mg total) by mouth daily.    ibuprofen (ADVIL) 200 MG Oral Tab Take 4 tablets (800 mg total) by mouth as needed for Pain.    leflunomide 20 MG Oral Tab Take 1 tablet (20 mg total) by mouth daily.    BuPROPion HCl ER, XL, 150 MG Oral Tablet 24 Hr 1 tablet (150 mg total)  in the morning and 1 tablet (150 mg total) before bedtime.    alprazolam 0.25 MG Oral Tab Take 1 tablet (0.25 mg total) by mouth nightly as needed for Sleep.     No current facility-administered medications for this visit.         REVIEW OF SYSTEMS:  A 10-point system was reviewed.  Pertinent positives and negatives are noted in HPI.      PHYSICAL EXAMINATION:  VITAL SIGNS: /74   Pulse 90   Resp 16   Wt 144 lb (65.3 kg)   LMP 09/24/2024 (Approximate)   BMI 26.34 kg/m²   General: Alert, good recall of personal medical history    Respiratory: Non labored breathing on room air    Cardiovascular: Regular rate    Mental status: Alert, oriented, language fluent, comprehension intact    Cranial nerves:  VF full to confrontation bilaterally. Pupils equal, round, equally reactive to light and accommodation. Extraocular eye movements intact without nystagmus. Face sensation intact to light touch. Face strength symmetric and intact. No dysarthria.    Motor:   Power:   -Right upper extremity: shoulder abductors 5, finger  extension 5  -Left upper extremity: shoulder abductors 5, finger extension 5  -Right lower extremity: hip flexion 5, dorsiflexion 5  -Left lower extremity:  hip flexion 5, dorsiflexion 5  Tone: Normal   Bulk: Normal  Abnormal movements: None    Sensory: Intact to light touch, vib >20s except right great toe 16s, intact to pinpirick face distal extremities    Coordination: no dysmetria with purposeful hand movements    Reflexes: Right/Left: Biceps 2/2,  brachioradialis 2/2. Patella 2/2, achilles 2/2,.   Gait: Narrow based, symmetric arm swing, no gait assist    DIAGNOSTIC DATA:    Component      Latest Ref Rng 4/12/2024   PROTEIN, TOTAL      5.7 - 8.2 g/dL 6.7    Albumin      3.75 - 5.21 g/dL 4.35    ALPHA-1-GLOBULINS      0.19 - 0.46 g/dL 0.27    ALPHA-2-GLOBULINS      0.48 - 1.05 g/dL 0.67    BETA GLOBULINS      0.68 - 1.23 g/dL 0.82    GAMMA GLOBULINS      0.62 - 1.70 g/dL 0.58 (L)    ALBUMIN/GLOBULIN RATIO      1.00 - 2.00  1.85    SPE INTERPRETATION No apparent monoclonal protein on serum electrophoresis.    IMMUNOFIXATION No monoclonal protein detected by immunofixation.…    KAPPA FREE LIGHT CHAIN      0.330 - 1.940 mg/dL 1.093    LAMBDA FREE LIGHT CHAIN      0.571 - 2.630 mg/dL 0.893    KAPPA/LAMBDA FLC RATIO      0.26 - 1.65  1.22    Cholesterol, Total      <200 mg/dL 232 (H)    HDL Cholesterol      40 - 59 mg/dL 60 (H)    Triglycerides      30 - 149 mg/dL 173 (H)    LDL Cholesterol Calc      <100 mg/dL 141 (H)    VLDL      0 - 30 mg/dL 32 (H)    NON-HDL CHOLESTEROL      <130 mg/dL 172 (H)    Patient Fasting for Lipid? Yes    Cadmium Ur      None detected ug/L None Detected    Mercury Ur      0 - 19 ug/L None Detected    Arsenic Total Ur      0 - 9 ug/L None Detected    Lead Ur      0 - 49 ug/L None Detected    Creatinine, Urine      0.30 - 3.00 g/L 0.41    Lead Blood      0.0 - 3.4 ug/dL <1.0    ARSENIC BLOOD      0 - 9 ug/L 2    Mercury Blood      0.0 - 14.9 ug/L <1.0    Cadmium, Blood      0.0 - 1.2 ug/L  <0.5    Vit E Alpha [place]      7.0 - 25.1 mg/L 13.3    Vit E Gamma [place]      0.5 - 5.5 mg/L 1.7    Vitamin B12      193 - 986 pg/mL 267    VITAMIN B6      3.4 - 65.2 ug/L 4.5    VITAMIN B1 (THIAMINE), WHOLE B      66.5 - 200.0 nmol/L 130.2    TSH      0.358 - 3.740 mIU/mL 0.785    FERRITIN      12.0 - 240.0 ng/mL 7.4 (L)    Anti-SSA Antibody, IGG      <7 U/mL <0.4    Anti-SSB Antibody, IGG      <7 U/mL <0.4    Lyme Screen IgG and IgM      Negative  Negative    Gliadin Deamidated IgA Ab      <7.0 U/mL 0.5    MISC TEST SEE COMMENTS       Legend:  (L) Low  (H) High  Component      Latest Ref Rng 3/13/2024   Hbsag Screen Index <0.10    HBSAg Screen      Nonreactive   Nonreactive    HEPATITIS B SURFACE AB QUAL      Reactive   Reactive    HEPATITIS B SURFACE AB QUANT      mIU/mL 118.53    HEPATITIS B CORE AB, TOTAL      Nonreactive   Nonreactive    MYELOPEROX ANTIBODIES, IGG      0.0 - 0.9 units <0.2    SERINE PROTEASE3, IGG      0.0 - 0.9 units <0.2    Cytoplasmic (C-ANCA)      Neg:<1:20 titer <1:20    Perinuclear (P-ANCA)      Neg:<1:20 titer <1:20    ATYPICAL PANCA      Neg:<1:20 titer <1:20    Expanded GUNJAN Antibody Screen, IGG      <0.7 ug/l 0.10    Anti-dsDNA antibody      <10 IU/mL 3.1    Connective Tissue Disease Screen Interpretation      Negative  Negative    HEMOGLOBIN A1c      <5.7 % 5.2    ESTIMATED AVERAGE GLUCOSE      68 - 126 mg/dL 103    CALCIUM, IONIZED      4.5 - 5.6 mg/dL 4.9    CK      26 - 192 U/L 34    COMPLEMENT C4      10.0 - 40.0 mg/dL 37.1    COMPLEMENT C3      90.0 - 180.0 mg/dL 143.0    HCV AB      Non Reactive  Non Reactive    RHEUMATOID FACTOR      <15 IU/mL <10    Cryoglob Ql      None detected  Comment    HIV Antigen Antibody Combo      Non-Reactive  Non-Reactive    HCV Neg Interp Comment      Component      Latest Ref Rng 9/27/2024   ACE      14 - 82 U/L <15    VITAMIN D, 25-OH, TOTAL      30.0 - 100.0 ng/mL 48.4      IMAGING:  MRI brain wo, MRI lumbar wo (3/29/2022):  Impression: Normal  MRI of brain with contrast.     Impression:     1.  Mild disc desiccation without disc herniation, central stenosis, or foraminal narrowing throughout lumbar spine.  See above discussion.     2.  Normal conus medullaris.  No focal collections are noted in lumbar spinal canal.     3.  Normal alignment.  Vertebral body height and marrow signal appear normal.     CTA head/neck 7/3/22  Impression:   1. Patency of the intracranial arterial circulation   2. Patency of the arterial vasculature of the neck.   3. No acute intracranial abnormality.   See above details/description of other findings. Please clinically correlate.     MR brain 2/2022:   FINDINGS:   No restricted diffusion to suggest acute intracranial process.     No abnormal parenchymal or leptomeningeal enhancement.     There is no acute intracranial hemorrhage, extracerebral fluid collection, or significant midline shift.     Ventricles and sulci are normal in size and configuration for the patient's age. No focal white matter lesion     The visualized paranasal sinuses and mastoid air cells are clear.     Partially visualized cervical spondylosis in the mid cervical spine with associated mild spinal canal stenosis     IMPRESSION:     1.  No acute intracranial abnormality.     2.  No white matter lesion or abnormal enhancement     - OSH (1/25/2022): neg (JUNE, RF, CCP), normal CRP and ESR  MRI wwo R hand (2/22/23): wnl, no signs of synovitis    Per 2022 note \" [facility] which per patient was normal maybe slight difference is disc. EMG normal.\"     MRI SPINE THORACIC (W+WO) (CPT=72157)    Result Date: 3/17/2024  CONCLUSION:  Mild posterior degenerative disc bulging at T7-8.  There is no significant central canal stenosis or nerve root impingement.   LOCATION:  [facility]    Dictated by (CST): Luis Peralta MD on 3/17/2024 at 1:18 PM     Finalized by (CST): Luis Peralta MD on 3/17/2024 at 1:20 PM       MRI SPINE CERVICAL (W+WO)  (CPT=72156)    Result Date: 3/17/2024  CONCLUSION:   1. Moderate degenerative disc bulge/osteophyte complex at C5-6 causing moderate central canal stenosis and mild bilateral neural foraminal narrowing.  2. There have disc bulge/osteophyte complex at C6-7 eccentric to the right lateral aspect of the disc space.  There is moderate central canal stenosis and moderate right and mild left neural foraminal narrowing.   LOCATION:  [facility]   Dictated by (CST): Luis Peralta MD on 3/17/2024 at 1:04 PM     Finalized by (CST): Luis Peralta MD on 3/17/2024 at 1:09 PM        FINDINGS:    MRI BRAIN  INTRACRANIAL:  There are no focal parenchymal brain abnormalities.  Diffusion weighted imaging was performed and is unremarkable.  There is no evidence for acute infarction.  There is no evidence of hemorrhage or mass lesion.  No lesions within Meckel's  cave are noted.  VENTRICLES/SULCI:   Ventricles and sulci are normal in caliber.  There are no extra-axial fluid collections.  There is no midline shift.  SINUSES/ORBITS:       The visualized paranasal sinuses are clear.  The orbits are unremarkable.  MASTOIDS:       The mastoids are clear.     MRA BRAIN  INTRACRANIAL CAROTIDS:         No visible stenosis or aneurysm.  ANTERIOR CEREBRALS:         No visible stenosis or aneurysm.  ANTERIOR COMMUNICATING:  No visible stenosis or aneurysm.  MIDDLE CEREBRALS:         No visible stenosis or aneurysm.  POSTERIOR COMMUNICATING: No visible stenosis or aneurysm.  SUPERIOR CEREBELLARS:         No visible stenosis or aneurysm.  BASILAR:             No visible stenosis or aneurysm.  INTRACRANIAL VERTEBRALS: No visible significant stenosis or dissection.     MRA NECK  COMMON CAROTID:                 Normal for age with no visible significant stenosis or dissection.  CERVICAL INTERNAL CAROTID:  Normal for age with no visible significant stenosis or dissection.  EXTERNAL CAROTID:               Normal for age with no visible significant stenosis or  dissection.  CERVICAL VERTEBRAL:               Normal for age with no visible significant stenosis or dissection.                   Impression   CONCLUSION:  No acute abnormality on MR angiography of the head and neck.     Skin bx report does not have (arm, hip, and leg)    Lower lip biopsy neg [facility]    Electromyelogram and nerve conduction study (EMG/NCS) Conclusion:   This is a normal study with no electrodiagnostic evidence of a   large fiber polyneuropathy affesting right UE or LE at this time.       ______________________   Kvng Cao MD   Neuromuscular Fellow     This test was done under my supervision and I personally reviewed   the interpretation of the study.     _____________________   Mark Richardson MD   EMG Attending     Recent work-up at [facility]:  -Rheumatology etiology of small fiber neuropathy remains unclear, advised ACE testing. Regarding cyclical low grade fevers and arthralgias, advised periodic fever syndrome testing and whole genome sequencing with genetics. Arthalgias felt possible 2/2 hypermobility (possible dysautonomia? Associated)  -Neurology Dr Caro felt brachial plexu entrapment neurogenic tos, diffuse pain with wide spread central sensitization, dpondylosis with arthorpathy and paraspinal myofascial pain, advised posture, core training, rom exercises, PT, biofeedback.     Recent work up [facility] [place]:   -Dr Romano concerned for cytokine disruption, e.g., IL 1 in setting of intermittent fevers, weakness. Supported whole genome sequencing, advised autonomics specialist.       MRI SPINE CERVICAL (CPT=72141)    Result Date: 11/27/2024  CONCLUSION:   Stable moderate degenerative disc bulge/osteophyte complex at C5-6 and C6-7 with moderate central canal stenosis and mild to moderate neural foraminal narrowing.   LOCATION:  [facility]   Dictated by (CST): Luis Peralta MD on 11/27/2024 at 8:44 AM     Finalized by (CST): Luis Peralta MD on 11/27/2024 at 9:09 AM       XR VIDEO  SWALLOW (QYX=[id number])    Result Date: 11/22/2024  PROCEDURE:  XR VIDEO SWALLOW (CPT=74230)  TECHNIQUE:  Video fluoroscopic swallowing study was performed in cooperation with the speech pathologist.  Barium of varying consistencies was administered orally with patient in lateral projection.  COMPARISON:  None.  INDICATIONS:  R13.10 Dysphagia, unspecified type  PATIENT STATED HISTORY: (As transcribed by Technologist)  Patient shares that she has history of aspiration and choking episodes a few years back. She mentions occasionally having trouble swallowing her morning medications.   CINE CAPTURES:  9 FLUORSCOPY TIME:  33 seconds RADIATION DOSE (AIR KERMA PRODUCT):  36.8 uGy/m2   FINDINGS:  PHARYNGEAL PHASE:  Normal for age. ASPIRATION:  None. PENETRATION:  Normal. OTHER:  Negative.  PLEASE SEE SPEECH PATHOLOGIST REPORT FOR FULL ASSESSMENT OF SWALLOWING MECHANISM.   LOCATION:  [facility]    Dictated by ([facility]): Luis Peralta MD on 11/22/2024 at 11:08 AM     Finalized by (CST): Luis Peralta MD on 11/22/2024 at 11:08 AM         MRI SPINE LUMBAR (W+WO) (CPT=72158)    Result Date: 10/11/2024  CONCLUSION:  1. The L3-4 disc reveals a prominent left posterior lateral disc protrusion extending into the left subarticular zone and left neural foramina measuring approximately 0.9 x 1.2 cm.  There is associated secondary extensive left subarticular stenosis and left neural foraminal stenosis. 2. The L4-5 disc reveals mild desiccation and mild annular disc bulge.  There is mild to moderate bilateral subarticular stenosis without central canal or neural foraminal stenosis.   LOCATION:  [facility]      Dictated by ([facility]): Nazario Lucas DO on 10/11/2024 at 11:19 AM     Finalized by (CST): Nazario Lucas DO on 10/11/2024 at 11:43 AM           ASSESSMENT/PLAN:  Morelia Camarillo is a 44Yrs old female with a medical history of unspecified connective tissue disease (on leflunomide, rheumatologists with mixed opinions), cervical myelopathy (Moderate  degenerative disc bulge at c5-6 with moderate central canal stenosis, seeing Dr. 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 Symptoms worse with activity. She recently realized there is a family history with similar constellation of symptoms, paternal cousin and paternal grandmother. Thinks leflunomide may have slowed sx progression, unable to tolerate prednisone wean (neuro sxs worsened from 5 to 4). Sweat test with reduced sweat on right side suggestive of small fiber neuropathy affecting sudomotor fibers. Recent skin biopsy reportedly normal (per Dr. Romano note). Lip biopsy result neg ([facility]). Neuroaxis imaging MRI brain, MRA head/neck, MRI C spine, MRI T spine with degenerative changes causing moderate central canal stenosis cervical. Blood work unrevealing thus far. Clinical picture with small fiber neuropathy of unclear etiology. Given lack of long tract sign waxing and waning sensory sxs seem more likely secondary to small fiber neuropathy rather than known cervical degenerative disease, may be multifactorial.    She has seen many specialists, agree with other specialists, given family history and constellation of symptoms of unclear etiology, would consider whole genome sequencing and panel to assess for cyclical fever syndromes. Saw  at [facility] who advised she needs to see an in house physician for considerationg of whole genome sequencing per Morelia, referral placed. Also advise autonomics specialist as previously advised. She is to continue following with multidisciplinary team and let me know if new symptoms.      Plan:  -Referral to Dr. Pao [facility] Peripheral Neurology, nursing will call you when referral faxed  -Continue seeing genetics: advise whole genome sequencing and  genetic panel for cyclical fevers  -[employer] [facility] autonomics specialist  -Continue following with neurosurgery, rheumatology  -Follow up with Dr Castillo 3 months    Encounter Diagnoses   Name Primary?    Small fiber neuropathy Yes    Paresthesias     Sensory impairment     Right leg weakness     Cervical myelopathy (HCC)     Lumbar herniated disc      Total time 34 minutes including chart review, eliciting history, physical exam, and counseling.    Jeannette Bruno, DO

## 2025-03-06 ENCOUNTER — LAB REQUISITION (OUTPATIENT)
Dept: LAB | Age: 45
End: 2025-03-06

## 2025-03-06 PROCEDURE — PSEU9049 QUANTIFERON TB PLUS: Performed by: CLINICAL MEDICAL LABORATORY

## 2025-03-06 PROCEDURE — 86480 TB TEST CELL IMMUN MEASURE: CPT | Performed by: CLINICAL MEDICAL LABORATORY

## 2025-03-08 LAB
GAMMA INTERFERON BACKGROUND BLD IA-ACNC: 0.06 IU/ML
M TB IFN-G BLD-IMP: NEGATIVE
M TB IFN-G CD4+ BCKGRND COR BLD-ACNC: 0 IU/ML
M TB IFN-G CD4+CD8+ BCKGRND COR BLD-ACNC: 0 IU/ML
MITOGEN IGNF BCKGRD COR BLD-ACNC: 1 IU/ML

## 2025-04-29 ENCOUNTER — OFFICE VISIT (OUTPATIENT)
Dept: NEUROLOGY | Facility: CLINIC | Age: 45
End: 2025-04-29
Payer: COMMERCIAL

## 2025-04-29 VITALS
DIASTOLIC BLOOD PRESSURE: 90 MMHG | RESPIRATION RATE: 16 BRPM | BODY MASS INDEX: 26 KG/M2 | HEART RATE: 80 BPM | WEIGHT: 144 LBS | SYSTOLIC BLOOD PRESSURE: 118 MMHG

## 2025-04-29 DIAGNOSIS — R42 POSITIONAL LIGHTHEADEDNESS: ICD-10-CM

## 2025-04-29 DIAGNOSIS — I95.1 ORTHOSTATIC INTOLERANCE: ICD-10-CM

## 2025-04-29 DIAGNOSIS — R35.0 URINARY FREQUENCY: ICD-10-CM

## 2025-04-29 DIAGNOSIS — M35.9 CONNECTIVE TISSUE DISEASE (HCC): Primary | ICD-10-CM

## 2025-04-29 DIAGNOSIS — D89.839 CYTOKINE RELEASE SYNDROME: ICD-10-CM

## 2025-04-29 PROCEDURE — 3074F SYST BP LT 130 MM HG: CPT | Performed by: OTHER

## 2025-04-29 PROCEDURE — 3080F DIAST BP >= 90 MM HG: CPT | Performed by: OTHER

## 2025-04-29 PROCEDURE — 99215 OFFICE O/P EST HI 40 MIN: CPT | Performed by: OTHER

## 2025-04-29 NOTE — PROGRESS NOTES
[facility]  Neurology     Morelia Camarillo Patient Status:  No patient class for patient encounter    1980 MRN [id number]   Location [unfilled] [facility] Felecia Sanchez MD          Subjective:  Morelia Camarillo is a(n) 44 year old female with history of connective tissue disorder who is being followed for small fiber neuropathy. Patient reports a few years ago had a sunburn and afterwards had swelling in the hips and most joints and skin changes, started having joint flares. Months later she woke up with tingling and numbness on the right face, chest, abdomen and leg, and pain. Progressed over the next couple of months. Stroke and MS were ruled out. Had extensive work up. 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 Saw neuroimmunology and is going to have genetic testing. So far had mitochondrial testing. Has intermittent low grade fever (99-100F), and joint pain swelling. Leflunomide has helped her paresthesias. Prescribed by Rheum, Dr. Irizarry through . On prednisone 5mg for the past year. Prednisone also helped her tingling. Urinary frequency. Has lightheadedness with standing.        Data review    Summary:   Skin temperature was maintained at ? 32   ºC.   Sweat response was decreased on the R. forearm (0.07 µL/cm2 ,   normal 0.20 µL/cm2 ) and absent in the R. foot, distal and   proximal leg.     Conclusion:   This is an abnormal study suggestive for a small fiber neuropathy   affecting the sudomotor fibers in the RUE and RUE.       Skin bx report does not have (arm, hip, and leg)     [facility]     Electromyelogram and nerve conduction study (EMG/NCS) Conclusion:   This is a normal study with no electrodiagnostic evidence of a   large fiber polyneuropathy affesting right UE or LE at this time.       ______________________   Kvng Cao MD   Neuromuscular Fellow     This  test was done under my supervision and I personally reviewed   the interpretation of the study.     _____________________   Mark Richardson MD   EMG Attending      Recent work-up at [facility]:  -Rheumatology etiology of small fiber neuropathy remains unclear, advised ACE testing. Regarding cyclical low grade fevers and arthralgias, advised periodic fever syndrome testing and whole genome sequencing with genetics. Arthalgias felt possible 2/2 hypermobility (possible dysautonomia? Associated)  -Neurology Dr Caro felt brachial plexu entrapment neurogenic tos, diffuse pain with wide spread central sensitization, dpondylosis with arthorpathy and paraspinal myofascial pain, advised posture, core training, rom exercises, PT, biofeedback.      Recent work up [facility]:   -Dr Romano concerned for cytokine disruption, e.g., IL 1 in setting of intermittent fevers, weakness. Supported whole genome sequencing, advised autonomics specialist.         MRI SPINE CERVICAL (CPT=72141)     Result Date: 11/27/2024  CONCLUSION:   Stable moderate degenerative disc bulge/osteophyte complex at C5-6 and C6-7 with moderate central canal stenosis and mild to moderate neural foraminal narrowing.     PAST MEDICAL HISTORY: Past Medical History[1]    PAST SURGICAL HISTORY: Past Surgical History[2]    Family history: Reviewed. No changes since last encounter. Father psoriatic arthritis, paternal cousin brain fog, weakness    ALLERGIES: Allergies[3]    MEDICATIONS: EMR reviewed Medications - Current[4]    REVIEW OF SYSTEMS:  General: denies any fever or chills.     Eye: no pain or redness;  Ear, mouth, throat: no hearing change, no throat pain or soreness;  Respiratory: Denies: Difficulty Breathing, Chronic Cough and Wheezing.  Cardiovascular: NO Chest Pain and Palpitations.   GI: no abdominal pain, no nausea or diarrhea;  : no incontinence;  Neurological:  See history; relevant items discussed in the history.  Psychiatric: no depression,  no suicidal attempt,   Musculoskeletal:  NO current complaint,  Endo: no cold intolerance, appetite is normal, no weight change;  Skin: warm, no rash, no allergy , no skin bruise or abnormal bleeding,     Objective:  Blood pressure 118/90, pulse 80, resp. rate 16, weight 144 lb (65.3 kg), last menstrual period 09/24/2024, not currently breastfeeding.  Body mass index is 26.34 kg/m². Vitals reviewed.  Physical Exam:  General Exam:  Appearance: well developed,  nurished , in no acute distress,   Pink Conjunctiva;  Neck: supple, no bruits;  Cardiac: S1/S2 RRR  Lungs: CTA B/L;  Musculoskeletal: no joint tenderness, others see neuro exam;  Extremities:  no pitting edema, no cyanosis or skin rash,  pulse is present,  Neurologic Examination  Mental Status  Is intact for age, and Language is intact, no slurred speech; calm, no acute stress, pleasant,   CN is normal from II to XII, visual field is full, EOMI, pupil symmetrical, light reflexes are present, fundus exam is normal. Face symmetrical with normal sensation, hearing normal, shoulder shrugging normal.   Motor:  NO drift. TEDDY intact, normal tone, normal strength in both arms and legs, no tremor of any kind;  Sensory: pp slightly decreased on the right face, arm and leg (90%)  DTRs   Symmetric 2/4 in all limbs,   No Babinski sign,   COORDINATION: Normal FTN and HTS tests,   GAIT:  Normal gait, can do  tandem walk, romberg test is negative,   Special tests:     Labs:  Lab Results   Component Value Date    GLU 94 02/26/2024    BUN 12 02/26/2024    CREATSERUM 0.73 02/26/2024    ANIONGAP 7 02/26/2024    CA 9.7 02/26/2024     02/26/2024    K 4.2 02/26/2024     02/26/2024    CO2 26.0 02/26/2024    OSMOCALC 290 02/26/2024         Imaging:      Assessment/Plan:   Encounter Diagnoses   Name Primary?    Cytokine release syndrome     Connective tissue disease (HCC) Yes    Positional lightheadedness     Orthostatic intolerance     Urinary frequency        Right sided  numbness and paresthesias, joint disorder with cyclic low grade fevers and joint inflammation, extensive work up at [facility], [facility], symptoms improved with immunotherapy. 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 Lopez Romano at [facility] and with Dr. Ivon Bryson at   Continue genetic testing with   She is being treated with leflunomide and prednisone 5 mg by rheumatology    Positional lightheadedness, mild urinary frequency, questionable abnormal sweat test  Recommend to do autonomic testing including tilt table and repeat sweat test, off Wellbutrin (would need to wean from 150mg BID to 150mg daily to 150mg every other day, then off)  Referral for autonomic testing at [facility] given      Requested Prescriptions      No prescriptions requested or ordered in this encounter          We discussed in depth regarding the diagnosis, prognosis, treatment.  The patient was given ample opportunity to ask questions. All questions and concerns were addressed.     Genesis Castillo,   Neuromuscular and General Neurology  [facility]             [1]   Past Medical History:   Anxiety    Arthritis    Unspecified autoimmune arthritis    Asthma (HCC)    Mild    Connective tissue disease (HCC)    Esophageal reflux    Improved since beginning Leflunomide    Fibromyalgia    IBS (irritable bowel syndrome)    Osteoarthritis    Recent finding of cervical spinal cord compression   [2]   Past Surgical History:  Procedure Laterality Date         [3] No Known Allergies  [4]   Current Outpatient Medications:     albuterol 108 (90 Base) MCG/ACT Inhalation Aero Soln, Inhale 2 puffs into the lungs every 4 to  6 hours., Disp: 18 g, Rfl: 1    predniSONE 5 MG Oral Tab, Take 1 tablet (5 mg total) by mouth daily., Disp: , Rfl:     ibuprofen (ADVIL) 200 MG Oral Tab, Take 4 tablets (800 mg total) by mouth as needed for Pain., Disp: , Rfl:     leflunomide 20 MG Oral Tab, Take 1 tablet (20 mg total) by mouth daily., Disp: , Rfl:     BuPROPion HCl ER, XL, 150 MG Oral Tablet 24 Hr, 1 tablet (150 mg total)  in the morning and 1 tablet (150 mg total) before bedtime., Disp: , Rfl: 0    alprazolam 0.25 MG Oral Tab, Take 1 tablet (0.25 mg total) by mouth nightly as needed for Sleep., Disp: , Rfl:

## 2025-04-29 NOTE — PATIENT INSTRUCTIONS
Refill policies:     Contact your pharmacy at least 5 days prior to running out of medication and have them send an electronic request or submit request through the “request refill” option in your [employer] account.  Allow 3-5 business days for refills; controlled substances may take longer.  If your prescription is due for a refill, please make sure you have a follow up appointment scheduled with the appropriate prescribing physician.  To best provide you care, patients receiving routine medications need to be seen at least once a year.  Patients receiving narcotic/controlled substance medications need to be seen at least once every 3 months.  Patients receiving narcotic/controlled substance medications will be required to sign an Opioid Treatment Agreement/Contract.  Refills will not be refilled on weekends or holidays; on-call physicians will not refill routine medications.  No narcotics or controlled substances are refilled after noon on Fridays or by on-call physicians.  Federal Law states narcotics must be electronically prescribed.  A 30-day supply with no refills is the maximum allowed by law.  In the event that your preferred pharmacy does not have the requested medication in stock (e.g., Backordered), it is your responsibility to find another pharmacy that has the requested medication available.  We will gladly send a new prescription to that pharmacy at your request.       Call in October to schedule follow up with Dr. Castillo in January/Feb

## 2025-05-08 ENCOUNTER — TELEPHONE (OUTPATIENT)
Dept: SURGERY | Facility: CLINIC | Age: 45
End: 2025-05-08

## 2025-05-08 NOTE — TELEPHONE ENCOUNTER
Called patient to reschedule office visit on 6/2/25 with Dr. Jimenez. He will no longer be available this day.

## 2025-07-08 ENCOUNTER — OFFICE VISIT (OUTPATIENT)
Dept: SURGERY | Facility: CLINIC | Age: 45
End: 2025-07-08
Payer: COMMERCIAL

## 2025-07-08 VITALS
DIASTOLIC BLOOD PRESSURE: 80 MMHG | HEART RATE: 90 BPM | BODY MASS INDEX: 25.76 KG/M2 | SYSTOLIC BLOOD PRESSURE: 110 MMHG | HEIGHT: 62 IN | WEIGHT: 140 LBS

## 2025-07-08 DIAGNOSIS — M50.30 DDD (DEGENERATIVE DISC DISEASE), CERVICAL: ICD-10-CM

## 2025-07-08 DIAGNOSIS — M48.02 MYELOPATHY CONCURRENT WITH AND DUE TO SPINAL STENOSIS OF CERVICAL REGION (HCC): Primary | ICD-10-CM

## 2025-07-08 DIAGNOSIS — G99.2 MYELOPATHY CONCURRENT WITH AND DUE TO SPINAL STENOSIS OF CERVICAL REGION (HCC): Primary | ICD-10-CM

## 2025-07-08 PROCEDURE — 3008F BODY MASS INDEX DOCD: CPT | Performed by: NEUROLOGICAL SURGERY

## 2025-07-08 PROCEDURE — 3074F SYST BP LT 130 MM HG: CPT | Performed by: NEUROLOGICAL SURGERY

## 2025-07-08 PROCEDURE — 3079F DIAST BP 80-89 MM HG: CPT | Performed by: NEUROLOGICAL SURGERY

## 2025-07-08 PROCEDURE — 99213 OFFICE O/P EST LOW 20 MIN: CPT | Performed by: NEUROLOGICAL SURGERY

## 2025-07-08 NOTE — PROGRESS NOTES
Neurosurgery Clinic Visit  2025    Morelia Camarillo PCP:  Felecia Sanchez MD    1980 MRN [id number]     HISTORY OF PRESENT ILLNESS:  Morelia Camarillo is a(n) 45 year old femalepresents to the office today for follow-up.    History of Present Illness  Morelia Camarillo is a 45 year old female who presents with upper extremity symptoms including tingling and numbness.    She has been experiencing tingling, numbness, and pain on the right side for the past three weeks, particularly in the morning. These symptoms became more noticeable after sleeping in a different bed during a vacation. Although the symptoms are more intermittent than consistent, they have not fully resolved and tend to last for a day or two before subsiding.    The tingling and numbness affect all fingers on the right hand, with the middle and pinky fingers being the most affected. The other two fingers are not as severely impacted.    Her lower back is doing well with only intermittent mild symptoms after completing TC treatment. Her neck has been generally well.    PHYSICAL EXAMINATION:  Vital Signs:  /80 (BP Location: Right arm, Patient Position: Sitting, Cuff Size: adult)   Pulse 90   Ht 62\"   Wt 140 lb (63.5 kg)   LMP 2024 (Approximate)   BMI 25.61 kg/m²   Strength is 5/5.  Reflexes are 1+ and symmetric.  No Steven's.      REVIEW OF STUDIES:    No new imaging is available for review.  The previous MRI scan from November was reviewed once again.    ASSESSMENT and PLAN:  1.  Cervical spondylosis and spinal stenosis.    At this point, her most prominent symptom likely reflects a right C7 radiculopathy.  She does not appear to be myelopathic.  Given the appearance of the MRI scan, I we will see her back in 6 months for clinical reevaluation.  We discussed the symptoms that would go along with increasing myelopathy, and she will keep an eye out for these.      Time spent on counseling/coordination of care:  15  Minutes    Total time spent with patient:  15 Minutes        7/8/2025  8:20 AM     This report was dictated using voice recognition technology.  Errors in syntax or recognition may occur, and should not be construed to change the meaning of a sentence.

## 2025-07-08 NOTE — PATIENT INSTRUCTIONS
Refill policies:    Allow 2-3 business days for refills; controlled substances may take longer.  Contact your pharmacy at least 5 days prior to running out of medication and have them send an electronic request or submit request through the “request refill” option in your [employer] account.  Refills are not addressed on weekends; covering physicians do not authorize routine medications on weekends.  No narcotics or controlled substances are refilled after noon on Fridays or by on call physicians.  By law, narcotics must be electronically prescribed.  A 30 day supply with no refills is the maximum allowed.  If your prescription is due for a refill, you may be due for a follow up appointment.  To best provide you care, patients receiving routine medications need to be seen at least once a year.  Patients receiving narcotic/controlled substance medications need to be seen at least once every 3 months.  In the event that your preferred pharmacy does not have the requested medication in stock (e.g. Backordered), it is your responsibility to find another pharmacy that has the requested medication available.  We will gladly send a new prescription to that pharmacy at your request.    Scheduling Tests:    If your physician has ordered radiology tests such as MRI or CT scans, please contact Central Scheduling at [phone] right away to schedule the test.  Once scheduled, the [facility] Centralized Referral Team will work with your insurance carrier to obtain pre-certification or prior authorization.  Depending on your insurance carrier, approval may take 3-10 days.  It is highly recommended patients assure they have received an authorization before having a test performed.  If test is done without insurance authorization, patient may be responsible for the entire amount billed.      Precertification and Prior Authorizations:  If your physician has recommended that you have a procedure or additional testing performed the [facility]  Centralized Referral Team will contact your insurance carrier to obtain pre-certification or prior authorization.    You are strongly encouraged to contact your insurance carrier to verify that your procedure/test has been approved and is a COVERED benefit.  Although the [facility] Centralized Referral Team does its due diligence, the insurance carrier gives the disclaimer that \"Although the procedure is authorized, this does not guarantee payment.\"    Ultimately the patient is responsible for payment.   Thank you for your understanding in this matter.  Paperwork Completion:  If you require FMLA or disability paperwork for your recovery, please make sure to either drop it off or have it faxed to our office at [phone]. Be sure the form has your name and date of birth on it.  The form will be faxed to our Forms Department and they will complete it for you.  There is a 25$ fee for all forms that need to be filled out.  Please be aware there is a 10-14 day turnaround time.  You will need to sign a release of information (GOMEZ) form if your paperwork does not come with one.  You may call the Forms Department with any questions at [phone].  Their fax number is [phone].

## 2025-07-08 NOTE — PROGRESS NOTES
Established patient:  Reason for follow up: cervical pain     Numeric Rating Scale:   Pain at Present:  3/10       Distribution of Pain:    bilateral more pain on the right side, states she has N/T in her arms                The following individual(s) verbally consented to be recorded using ambient AI listening technology and understand that they can each withdraw their consent to this listening technology at any point by asking the clinician to turn off or pause the recording:    Patient name: Morelia Camarillo

## 2025-07-22 ENCOUNTER — TELEPHONE (OUTPATIENT)
Dept: PHYSICAL THERAPY | Facility: HOSPITAL | Age: 45
End: 2025-07-22

## 2025-07-22 ENCOUNTER — PATIENT MESSAGE (OUTPATIENT)
Dept: SURGERY | Facility: CLINIC | Age: 45
End: 2025-07-22

## 2025-07-22 DIAGNOSIS — M54.12 CERVICAL RADICULOPATHY: Primary | ICD-10-CM

## 2025-07-22 NOTE — TELEPHONE ENCOUNTER
Noted that patient is having continued symptoms in her arm including pain, numbness, tingling, and partial mobility.   Patient requesting physical therapy.     LOV 7.8.25    \"ASSESSMENT and PLAN:  1.  Cervical spondylosis and spinal stenosis.     At this point, her most prominent symptom likely reflects a right C7 radiculopathy.  She does not appear to be myelopathic.  Given the appearance of the MRI scan, I we will see her back in 6 months for clinical reevaluation.  We discussed the symptoms that would go along with increasing myelopathy, and she will keep an eye out for these.\"    PT order pended, routed to JULIET pool to advise.

## (undated) NOTE — MR AVS SNAPSHOT
[facility] [address] [place] [address]  [address] [place] [id number] [phone]               Thank you for choosing us for your health care visit with Gregorio Mcmahoner, JACQUES.   We are glad to serve you and happy to provide you with this summary of your visi already taking blood thinners.   · For sore throats caused by allergies, try antihistamines to block the allergic reaction. · Remember: unless a sore throat is caused by a bacterial infection, antibiotics won’t help you.   Prevent future sore throats  Prev visit,  view other health information, and more. To sign up or find more information, go to [email or web address] and click on the Sign Up Now link in the [employer] box.      Enter your [employer] Activation Code exactly as it appears below along with yo

## (undated) NOTE — LETTER
Date & Time: 4/9/2018, 9:14 AM  Patient: Vignesh Garza  Attending Provider:    Sincerely,    Clarisa Cabrera MD         To Whom It May Concern:    Vignesh Garza was seen and treated in our department on 4/9/2018.  She should not return to work until 4/11/20